# Patient Record
Sex: FEMALE | Race: WHITE | Employment: UNEMPLOYED | URBAN - METROPOLITAN AREA
[De-identification: names, ages, dates, MRNs, and addresses within clinical notes are randomized per-mention and may not be internally consistent; named-entity substitution may affect disease eponyms.]

---

## 2018-01-05 ENCOUNTER — OFFICE VISIT (OUTPATIENT)
Dept: CARDIOLOGY | Age: 48
End: 2018-01-05

## 2018-01-05 VITALS
DIASTOLIC BLOOD PRESSURE: 80 MMHG | TEMPERATURE: 97.5 F | WEIGHT: 141.8 LBS | BODY MASS INDEX: 24.21 KG/M2 | SYSTOLIC BLOOD PRESSURE: 122 MMHG | OXYGEN SATURATION: 98 % | HEIGHT: 64 IN | HEART RATE: 85 BPM | RESPIRATION RATE: 20 BRPM

## 2018-01-05 DIAGNOSIS — Z82.49 FAMILY HISTORY OF HEART DISEASE IN MALE FAMILY MEMBER BEFORE AGE 55: ICD-10-CM

## 2018-01-05 DIAGNOSIS — I10 ESSENTIAL HYPERTENSION: Primary | ICD-10-CM

## 2018-01-05 DIAGNOSIS — I34.1 MITRAL VALVE PROLAPSE: ICD-10-CM

## 2018-01-05 PROCEDURE — 99213 OFFICE O/P EST LOW 20 MIN: CPT | Performed by: INTERNAL MEDICINE

## 2018-01-05 ASSESSMENT — ENCOUNTER SYMPTOMS
SHORTNESS OF BREATH: 0
VOMITING: 0
NAUSEA: 0
WHEEZING: 0
BACK PAIN: 0
CHEST TIGHTNESS: 0

## 2018-01-10 ENCOUNTER — OFFICE VISIT (OUTPATIENT)
Dept: FAMILY MEDICINE CLINIC | Age: 48
End: 2018-01-10

## 2018-01-10 VITALS
WEIGHT: 142 LBS | HEART RATE: 85 BPM | OXYGEN SATURATION: 98 % | HEIGHT: 64 IN | SYSTOLIC BLOOD PRESSURE: 122 MMHG | BODY MASS INDEX: 24.24 KG/M2 | DIASTOLIC BLOOD PRESSURE: 82 MMHG

## 2018-01-10 DIAGNOSIS — L23.0 ALLERGIC CONTACT DERMATITIS DUE TO METALS: ICD-10-CM

## 2018-01-10 DIAGNOSIS — I10 ESSENTIAL HYPERTENSION: Primary | ICD-10-CM

## 2018-01-10 DIAGNOSIS — Z82.49 FAMILY HISTORY OF HEART DISEASE IN MALE FAMILY MEMBER BEFORE AGE 55: ICD-10-CM

## 2018-01-10 DIAGNOSIS — I34.1 MITRAL VALVE PROLAPSE: ICD-10-CM

## 2018-01-10 DIAGNOSIS — E55.9 HYPOVITAMINOSIS D: ICD-10-CM

## 2018-01-10 PROCEDURE — 99213 OFFICE O/P EST LOW 20 MIN: CPT | Performed by: FAMILY MEDICINE

## 2018-01-10 RX ORDER — CLOBETASOL PROPIONATE 0.5 MG/G
OINTMENT TOPICAL
Qty: 15 G | Refills: 0 | Status: SHIPPED | OUTPATIENT
Start: 2018-01-10 | End: 2020-01-21

## 2018-01-10 RX ORDER — LISINOPRIL AND HYDROCHLOROTHIAZIDE 20; 12.5 MG/1; MG/1
2 TABLET ORAL DAILY
Qty: 180 TABLET | Refills: 3 | Status: SHIPPED | OUTPATIENT
Start: 2018-01-10 | End: 2019-01-18 | Stop reason: SDUPTHER

## 2018-01-10 ASSESSMENT — PATIENT HEALTH QUESTIONNAIRE - PHQ9
2. FEELING DOWN, DEPRESSED OR HOPELESS: 0
SUM OF ALL RESPONSES TO PHQ9 QUESTIONS 1 & 2: 0
1. LITTLE INTEREST OR PLEASURE IN DOING THINGS: 0
SUM OF ALL RESPONSES TO PHQ QUESTIONS 1-9: 0

## 2018-01-10 NOTE — PROGRESS NOTES
Subjective:      Patient ID: Mary Salcido is a 41 y.o. female. Chief Complaint   Patient presents with    Hypertension     check up       HPI   Mary Salcido is a 41 y.o. female    Refills/checkup    Strong cardiac family history    History pre-eclampsia    Hypertension: Patient here for follow-up of elevated blood pressure. She is not formally exercising but is active and is adherent to low salt diet. Blood pressure is well controlled at home. Cardiac symptoms none. Patient denies  chest pressure/discomfort, claudication, dyspnea, exertional chest pressure/discomfort, fatigue, irregular heart beat, lower extremity edema, near-syncope, orthopnea, palpitations, paroxysmal nocturnal dyspnea, syncope and tachypnea. Cardiovascular risk factors: dyslipidemia, family history of premature cardiovascular disease and hypertension. Use of agents associated with hypertension: none. History of target organ damage: none.       Past Medical History:   Diagnosis Date    Hypertension     Mitral valve prolapse     Pre-eclampsia     Routine gynecological examination     SYLVESTER Rivera     Past Surgical History:   Procedure Laterality Date     SECTION       Family History   Problem Relation Age of Onset    Heart Disease Brother 46     MI    Other Maternal Grandfather 50     MI     Social History     Social History    Marital status:      Spouse name: N/A    Number of children: N/A    Years of education: N/A     Social History Main Topics    Smoking status: Never Smoker    Smokeless tobacco: Never Used    Alcohol use No    Drug use: No    Sexual activity: Yes     Partners: Male     Other Topics Concern    None     Social History Narrative    None     Current Outpatient Prescriptions   Medication Sig Dispense Refill    lisinopril-hydrochlorothiazide (PRINZIDE;ZESTORETIC) 20-12.5 MG per tablet Take 2 tablets by mouth daily 180 tablet 3    Coenzyme Q10 (CO Q 10 PO) Take by mouth      aspirin 81 aerobic exercise. Reduce stress    Follows with GYN    Updated health maintenance.     Continue Daily ASA recommended omega 3 fatty acids          Milton Soares MD

## 2018-01-25 DIAGNOSIS — I10 ESSENTIAL HYPERTENSION: ICD-10-CM

## 2018-01-25 DIAGNOSIS — E55.9 HYPOVITAMINOSIS D: ICD-10-CM

## 2018-01-25 LAB
ALBUMIN SERPL-MCNC: 4.8 G/DL (ref 3.9–4.9)
ALP BLD-CCNC: 83 U/L (ref 40–130)
ALT SERPL-CCNC: 13 U/L (ref 0–33)
ANION GAP SERPL CALCULATED.3IONS-SCNC: 16 MEQ/L (ref 7–13)
AST SERPL-CCNC: 14 U/L (ref 0–35)
BILIRUB SERPL-MCNC: 0.3 MG/DL (ref 0–1.2)
BUN BLDV-MCNC: 18 MG/DL (ref 6–20)
CALCIUM SERPL-MCNC: 9.6 MG/DL (ref 8.6–10.2)
CHLORIDE BLD-SCNC: 97 MEQ/L (ref 98–107)
CHOLESTEROL, TOTAL: 226 MG/DL (ref 0–199)
CO2: 23 MEQ/L (ref 22–29)
CREAT SERPL-MCNC: 0.46 MG/DL (ref 0.5–0.9)
GFR AFRICAN AMERICAN: >60
GFR NON-AFRICAN AMERICAN: >60
GLOBULIN: 2.4 G/DL (ref 2.3–3.5)
GLUCOSE BLD-MCNC: 95 MG/DL (ref 74–109)
HCT VFR BLD CALC: 43.4 % (ref 37–47)
HDLC SERPL-MCNC: 44 MG/DL (ref 40–59)
HEMOGLOBIN: 14.8 G/DL (ref 12–16)
LDL CHOLESTEROL CALCULATED: 143 MG/DL (ref 0–129)
MCH RBC QN AUTO: 32 PG (ref 27–31.3)
MCHC RBC AUTO-ENTMCNC: 34.1 % (ref 33–37)
MCV RBC AUTO: 93.9 FL (ref 82–100)
PDW BLD-RTO: 14.1 % (ref 11.5–14.5)
PLATELET # BLD: 401 K/UL (ref 130–400)
POTASSIUM SERPL-SCNC: 4.4 MEQ/L (ref 3.5–5.1)
RBC # BLD: 4.62 M/UL (ref 4.2–5.4)
SODIUM BLD-SCNC: 136 MEQ/L (ref 132–144)
TOTAL PROTEIN: 7.2 G/DL (ref 6.4–8.1)
TRIGL SERPL-MCNC: 197 MG/DL (ref 0–200)
VITAMIN D 25-HYDROXY: 44 NG/ML (ref 30–100)
WBC # BLD: 9.2 K/UL (ref 4.8–10.8)

## 2018-02-26 ENCOUNTER — OFFICE VISIT (OUTPATIENT)
Dept: CARDIOLOGY CLINIC | Age: 48
End: 2018-02-26
Payer: COMMERCIAL

## 2018-02-26 VITALS
OXYGEN SATURATION: 99 % | HEIGHT: 64 IN | DIASTOLIC BLOOD PRESSURE: 72 MMHG | BODY MASS INDEX: 24.62 KG/M2 | HEART RATE: 82 BPM | WEIGHT: 144.2 LBS | SYSTOLIC BLOOD PRESSURE: 112 MMHG | RESPIRATION RATE: 12 BRPM

## 2018-02-26 DIAGNOSIS — I34.1 MITRAL VALVE PROLAPSE: ICD-10-CM

## 2018-02-26 DIAGNOSIS — I10 ESSENTIAL HYPERTENSION: Primary | ICD-10-CM

## 2018-02-26 PROCEDURE — 99214 OFFICE O/P EST MOD 30 MIN: CPT | Performed by: INTERNAL MEDICINE

## 2018-02-26 RX ORDER — METOPROLOL SUCCINATE 25 MG/1
25 TABLET, EXTENDED RELEASE ORAL NIGHTLY
Qty: 30 TABLET | Refills: 3 | Status: SHIPPED | OUTPATIENT
Start: 2018-02-26 | End: 2018-03-26 | Stop reason: SDUPTHER

## 2018-02-26 ASSESSMENT — ENCOUNTER SYMPTOMS
APNEA: 0
CHEST TIGHTNESS: 0
SHORTNESS OF BREATH: 0

## 2018-02-26 NOTE — PROGRESS NOTES
MG tablet, Take 81 mg by mouth daily, Disp: , Rfl:     calcium carbonate (OSCAL) 500 MG TABS tablet, Take 500 mg by mouth daily. , Disp: , Rfl:     Multiple Vitamins-Minerals (MULTI COMPLETE PO), Take  by mouth., Disp: , Rfl:       Review of Systems:  Review of Systems   Constitutional: Negative for appetite change and fatigue. Respiratory: Negative for apnea, chest tightness and shortness of breath. Cardiovascular: Positive for palpitations. Negative for chest pain and leg swelling. Neurological: Negative for dizziness, syncope, weakness, light-headedness and headaches. Hematological: Does not bruise/bleed easily. Psychiatric/Behavioral: Negative for agitation, behavioral problems and confusion. The patient is not nervous/anxious and is not hyperactive. All other systems reviewed and are negative. Objective  Vitals:    02/26/18 1026   BP: 112/72   Site: Right Arm   Position: Sitting   Cuff Size: Medium Adult   Pulse: 82   Resp: 12   SpO2: 99%   Weight: 144 lb 3.2 oz (65.4 kg)   Height: 5' 4\" (1.626 m)         Physical Exam:  Physical Exam   Constitutional: She is oriented to person, place, and time. She appears well-developed and well-nourished. HENT:   Head: Normocephalic and atraumatic. Right Ear: External ear normal.   Left Ear: External ear normal.   Mouth/Throat: Oropharynx is clear and moist.   Eyes: Conjunctivae and EOM are normal. Pupils are equal, round, and reactive to light. Neck: Normal range of motion. Neck supple. Cardiovascular: Normal rate, regular rhythm, normal heart sounds and intact distal pulses. Pulmonary/Chest: Effort normal and breath sounds normal.   Abdominal: Soft. Bowel sounds are normal.   Musculoskeletal: Normal range of motion. Neurological: She is alert and oriented to person, place, and time. She has normal reflexes. Skin: Skin is warm and dry. Psychiatric: She has a normal mood and affect.  Her behavior is normal. Judgment and thought content normal.           Assessment/Orders:       ICD-10-CM ICD-9-CM    1. Essential hypertension I10 401.9 ECHO Complete 2D W Doppler W Color      NM Myocardial Spect Rest Exercise or Rx      Holter Monitor 24 Hour   2. Mitral valve prolapse I34.1 424.0 ECHO Complete 2D W Doppler W Color      NM Myocardial Spect Rest Exercise or Rx      Holter Monitor 24 Hour       Orders Placed This Encounter   Medications    metoprolol succinate (TOPROL XL) 25 MG extended release tablet     Sig: Take 1 tablet by mouth nightly     Dispense:  30 tablet     Refill:  3       There are no discontinued medications. Orders Placed This Encounter   Procedures    NM Myocardial Spect Rest Exercise or Rx     Standing Status:   Future     Standing Expiration Date:   2/26/2019     Scheduling Instructions: At [address] [place] [address] Specific Question:   Reason for Exam?     Answer: Tachycardia     Order Specific Question:   Procedure Type     Answer:   Exercise    Holter Monitor 24 Hour     At [facility]     Standing Status:   Future     Standing Expiration Date:   2/26/2019     Order Specific Question:   Reason for Exam?     Answer: Tachycardia    ECHO Complete 2D W Doppler W Color     Standing Status:   Future     Standing Expiration Date:   2/26/2019     Scheduling Instructions: At [address] [place] [address] Specific Question:   Reason for exam:     Answer:   htn         1. Patient to have stress test,echo and 24 hr holter. Add toprol 25mg qhs  2. Patient to see me in 1 month  > I will continue to monitor patient clinically, if symptoms develop or worsen, they are to let me know ASAP or head to the nearest emergeny room. > Follow up as discussed or call office sooner if needed.  > If refills are needed after appointment contact pharmacy.     Electronically signed by: Eloisa Mi MD  2/26/2018 1:20 PM

## 2018-03-15 DIAGNOSIS — R00.0 TACHYCARDIA: Primary | ICD-10-CM

## 2018-03-16 ENCOUNTER — HOSPITAL ENCOUNTER (OUTPATIENT)
Dept: NON INVASIVE DIAGNOSTICS | Age: 48
Discharge: HOME | End: 2018-03-16
Payer: COMMERCIAL

## 2018-03-16 ENCOUNTER — APPOINTMENT (OUTPATIENT)
Dept: NUCLEAR MEDICINE | Age: 48
End: 2018-03-16
Payer: COMMERCIAL

## 2018-03-16 ENCOUNTER — APPOINTMENT (OUTPATIENT)
Dept: NON INVASIVE DIAGNOSTICS | Age: 48
End: 2018-03-16
Payer: COMMERCIAL

## 2018-03-16 DIAGNOSIS — R00.0 TACHYCARDIA: ICD-10-CM

## 2018-03-16 DIAGNOSIS — I34.1 MITRAL VALVE PROLAPSE: ICD-10-CM

## 2018-03-16 DIAGNOSIS — I10 ESSENTIAL HYPERTENSION: ICD-10-CM

## 2018-03-16 LAB
LV EF: 60 %
LVEF MODALITY: NORMAL

## 2018-03-16 PROCEDURE — 93306 TTE W/DOPPLER COMPLETE: CPT

## 2018-03-16 PROCEDURE — 93017 CV STRESS TEST TRACING ONLY: CPT

## 2018-03-16 PROCEDURE — 93227 XTRNL ECG REC<48 HR R&I: CPT | Performed by: INTERNAL MEDICINE

## 2018-03-16 PROCEDURE — 93225 XTRNL ECG REC<48 HRS REC: CPT

## 2018-03-16 PROCEDURE — 93226 XTRNL ECG REC<48 HR SCAN A/R: CPT

## 2018-03-26 ENCOUNTER — OFFICE VISIT (OUTPATIENT)
Dept: CARDIOLOGY CLINIC | Age: 48
End: 2018-03-26
Payer: COMMERCIAL

## 2018-03-26 VITALS
OXYGEN SATURATION: 98 % | HEIGHT: 64 IN | BODY MASS INDEX: 24.62 KG/M2 | WEIGHT: 144.2 LBS | RESPIRATION RATE: 12 BRPM | DIASTOLIC BLOOD PRESSURE: 68 MMHG | HEART RATE: 72 BPM | SYSTOLIC BLOOD PRESSURE: 98 MMHG

## 2018-03-26 DIAGNOSIS — I10 ESSENTIAL HYPERTENSION: ICD-10-CM

## 2018-03-26 DIAGNOSIS — I34.1 MITRAL VALVE PROLAPSE: ICD-10-CM

## 2018-03-26 DIAGNOSIS — R00.0 TACHYCARDIA: Primary | ICD-10-CM

## 2018-03-26 PROCEDURE — 99213 OFFICE O/P EST LOW 20 MIN: CPT | Performed by: INTERNAL MEDICINE

## 2018-03-26 RX ORDER — METOPROLOL SUCCINATE 25 MG/1
25 TABLET, EXTENDED RELEASE ORAL NIGHTLY
Qty: 90 TABLET | Refills: 3 | Status: SHIPPED | OUTPATIENT
Start: 2018-03-26 | End: 2019-01-04 | Stop reason: SDUPTHER

## 2018-03-26 ASSESSMENT — ENCOUNTER SYMPTOMS
APNEA: 0
SHORTNESS OF BREATH: 0
CHEST TIGHTNESS: 0

## 2019-01-04 ENCOUNTER — OFFICE VISIT (OUTPATIENT)
Dept: CARDIOLOGY CLINIC | Age: 49
End: 2019-01-04
Payer: COMMERCIAL

## 2019-01-04 VITALS
DIASTOLIC BLOOD PRESSURE: 80 MMHG | SYSTOLIC BLOOD PRESSURE: 118 MMHG | BODY MASS INDEX: 24.92 KG/M2 | RESPIRATION RATE: 12 BRPM | WEIGHT: 146 LBS | HEART RATE: 76 BPM | HEIGHT: 64 IN

## 2019-01-04 DIAGNOSIS — I34.1 MITRAL VALVE PROLAPSE: ICD-10-CM

## 2019-01-04 DIAGNOSIS — R00.0 TACHYCARDIA: Primary | ICD-10-CM

## 2019-01-04 DIAGNOSIS — Z82.49 FAMILY HISTORY OF HEART DISEASE IN MALE FAMILY MEMBER BEFORE AGE 55: ICD-10-CM

## 2019-01-04 DIAGNOSIS — I10 ESSENTIAL HYPERTENSION: ICD-10-CM

## 2019-01-04 PROCEDURE — 99213 OFFICE O/P EST LOW 20 MIN: CPT | Performed by: INTERNAL MEDICINE

## 2019-01-04 RX ORDER — METOPROLOL SUCCINATE 25 MG/1
25 TABLET, EXTENDED RELEASE ORAL NIGHTLY
Qty: 90 TABLET | Refills: 3 | Status: SHIPPED | OUTPATIENT
Start: 2019-01-04 | End: 2019-03-27 | Stop reason: SDUPTHER

## 2019-01-04 ASSESSMENT — ENCOUNTER SYMPTOMS
APNEA: 0
ABDOMINAL DISTENTION: 0
COUGH: 0
CHEST TIGHTNESS: 0
BLOOD IN STOOL: 0
SHORTNESS OF BREATH: 0
ANAL BLEEDING: 0
DIARRHEA: 0
VOMITING: 0
ABDOMINAL PAIN: 0
NAUSEA: 0
COLOR CHANGE: 0

## 2019-01-18 ENCOUNTER — OFFICE VISIT (OUTPATIENT)
Dept: FAMILY MEDICINE CLINIC | Age: 49
End: 2019-01-18
Payer: COMMERCIAL

## 2019-01-18 VITALS
SYSTOLIC BLOOD PRESSURE: 130 MMHG | HEIGHT: 64 IN | HEART RATE: 76 BPM | WEIGHT: 146.6 LBS | BODY MASS INDEX: 25.03 KG/M2 | DIASTOLIC BLOOD PRESSURE: 80 MMHG | OXYGEN SATURATION: 97 %

## 2019-01-18 DIAGNOSIS — Z82.49 FAMILY HISTORY OF HEART DISEASE IN MALE FAMILY MEMBER BEFORE AGE 55: Primary | ICD-10-CM

## 2019-01-18 DIAGNOSIS — I10 ESSENTIAL HYPERTENSION: ICD-10-CM

## 2019-01-18 DIAGNOSIS — I34.1 MITRAL VALVE PROLAPSE: ICD-10-CM

## 2019-01-18 DIAGNOSIS — E55.9 HYPOVITAMINOSIS D: ICD-10-CM

## 2019-01-18 LAB
ALBUMIN SERPL-MCNC: 4.6 G/DL (ref 3.9–4.9)
ALP BLD-CCNC: 99 U/L (ref 40–130)
ALT SERPL-CCNC: 16 U/L (ref 0–33)
ANION GAP SERPL CALCULATED.3IONS-SCNC: 17 MEQ/L (ref 7–13)
AST SERPL-CCNC: 18 U/L (ref 0–35)
BILIRUB SERPL-MCNC: 0.3 MG/DL (ref 0–1.2)
BUN BLDV-MCNC: 16 MG/DL (ref 6–20)
CALCIUM SERPL-MCNC: 9.7 MG/DL (ref 8.6–10.2)
CHLORIDE BLD-SCNC: 96 MEQ/L (ref 98–107)
CHOLESTEROL, TOTAL: 226 MG/DL (ref 0–199)
CO2: 23 MEQ/L (ref 22–29)
CREAT SERPL-MCNC: 0.45 MG/DL (ref 0.5–0.9)
GFR AFRICAN AMERICAN: >60
GFR NON-AFRICAN AMERICAN: >60
GLOBULIN: 2.9 G/DL (ref 2.3–3.5)
GLUCOSE BLD-MCNC: 85 MG/DL (ref 74–109)
HCT VFR BLD CALC: 41.4 % (ref 37–47)
HDLC SERPL-MCNC: 38 MG/DL (ref 40–59)
HEMOGLOBIN: 14.2 G/DL (ref 12–16)
LDL CHOLESTEROL CALCULATED: 156 MG/DL (ref 0–129)
MCH RBC QN AUTO: 32.1 PG (ref 27–31.3)
MCHC RBC AUTO-ENTMCNC: 34.2 % (ref 33–37)
MCV RBC AUTO: 93.8 FL (ref 82–100)
PDW BLD-RTO: 14.2 % (ref 11.5–14.5)
PLATELET # BLD: 434 K/UL (ref 130–400)
POTASSIUM SERPL-SCNC: 3.7 MEQ/L (ref 3.5–5.1)
RBC # BLD: 4.41 M/UL (ref 4.2–5.4)
SODIUM BLD-SCNC: 136 MEQ/L (ref 132–144)
TOTAL PROTEIN: 7.5 G/DL (ref 6.4–8.1)
TRIGL SERPL-MCNC: 158 MG/DL (ref 0–200)
VITAMIN D 25-HYDROXY: 33.1 NG/ML (ref 30–100)
WBC # BLD: 8.9 K/UL (ref 4.8–10.8)

## 2019-01-18 PROCEDURE — 99213 OFFICE O/P EST LOW 20 MIN: CPT | Performed by: FAMILY MEDICINE

## 2019-01-18 RX ORDER — LISINOPRIL AND HYDROCHLOROTHIAZIDE 20; 12.5 MG/1; MG/1
2 TABLET ORAL DAILY
Qty: 180 TABLET | Refills: 3 | Status: SHIPPED | OUTPATIENT
Start: 2019-01-18 | End: 2020-01-21 | Stop reason: SDUPTHER

## 2019-01-18 ASSESSMENT — PATIENT HEALTH QUESTIONNAIRE - PHQ9
SUM OF ALL RESPONSES TO PHQ QUESTIONS 1-9: 0
SUM OF ALL RESPONSES TO PHQ9 QUESTIONS 1 & 2: 0
SUM OF ALL RESPONSES TO PHQ QUESTIONS 1-9: 0
1. LITTLE INTEREST OR PLEASURE IN DOING THINGS: 0
2. FEELING DOWN, DEPRESSED OR HOPELESS: 0

## 2019-03-27 RX ORDER — METOPROLOL SUCCINATE 25 MG/1
25 TABLET, EXTENDED RELEASE ORAL NIGHTLY
Qty: 90 TABLET | Refills: 3 | Status: SHIPPED | OUTPATIENT
Start: 2019-03-27 | End: 2020-01-06 | Stop reason: SDUPTHER

## 2019-09-21 ENCOUNTER — OFFICE VISIT (OUTPATIENT)
Dept: FAMILY MEDICINE CLINIC | Age: 49
End: 2019-09-21
Payer: COMMERCIAL

## 2019-09-21 VITALS
OXYGEN SATURATION: 98 % | HEIGHT: 64 IN | WEIGHT: 144.6 LBS | TEMPERATURE: 97.1 F | BODY MASS INDEX: 24.69 KG/M2 | HEART RATE: 73 BPM | DIASTOLIC BLOOD PRESSURE: 82 MMHG | SYSTOLIC BLOOD PRESSURE: 118 MMHG

## 2019-09-21 DIAGNOSIS — L30.1 DYSHIDROTIC ECZEMA: Primary | ICD-10-CM

## 2019-09-21 PROCEDURE — 99213 OFFICE O/P EST LOW 20 MIN: CPT | Performed by: NURSE PRACTITIONER

## 2019-09-21 NOTE — PROGRESS NOTES
connections:     Talks on phone: None     Gets together: None     Attends Restoration service: None     Active member of club or organization: None     Attends meetings of clubs or organizations: None     Relationship status: None    Intimate partner violence:     Fear of current or ex partner: None     Emotionally abused: None     Physically abused: None     Forced sexual activity: None   Other Topics Concern    None   Social History Narrative    None     Current Outpatient Medications on File Prior to Visit   Medication Sig Dispense Refill    metoprolol succinate (TOPROL XL) 25 MG extended release tablet Take 1 tablet by mouth nightly 90 tablet 3    lisinopril-hydrochlorothiazide (PRINZIDE;ZESTORETIC) 20-12.5 MG per tablet Take 2 tablets by mouth daily 180 tablet 3    clobetasol (TEMOVATE) 0.05 % ointment Apply topically 2 times daily. 15 g 0    Coenzyme Q10 (CO Q 10 PO) Take by mouth      aspirin 81 MG tablet Take 81 mg by mouth daily      calcium carbonate (OSCAL) 500 MG TABS tablet Take 500 mg by mouth daily.  Multiple Vitamins-Minerals (MULTI COMPLETE PO) Take  by mouth. No current facility-administered medications on file prior to visit. No Known Allergies    Review of Systems   Skin: Positive for rash. Objective  Vitals:    09/21/19 0838   BP: 118/82   Pulse: 73   Temp: 97.1 °F (36.2 °C)   SpO2: 98%   Weight: 144 lb 9.6 oz (65.6 kg)   Height: 5' 4\" (1.626 m)     Physical Exam   Constitutional: She is oriented to person, place, and time. She appears well-developed and well-nourished. Neurological: She is alert and oriented to person, place, and time. Skin: Skin is warm. Nursing note and vitals reviewed. Assessment & Plan     Diagnosis Orders   1. Dyshidrotic eczema         No orders of the defined types were placed in this encounter.       Orders Placed This Encounter   Medications    Crisaborole 2 % OINT     Sig: Apply 1 Squirt topically 2 times daily     Dispense:

## 2020-01-06 ENCOUNTER — OFFICE VISIT (OUTPATIENT)
Dept: CARDIOLOGY CLINIC | Age: 50
End: 2020-01-06
Payer: COMMERCIAL

## 2020-01-06 VITALS
RESPIRATION RATE: 20 BRPM | WEIGHT: 150.4 LBS | BODY MASS INDEX: 25.68 KG/M2 | OXYGEN SATURATION: 99 % | SYSTOLIC BLOOD PRESSURE: 122 MMHG | HEART RATE: 85 BPM | DIASTOLIC BLOOD PRESSURE: 80 MMHG | HEIGHT: 64 IN

## 2020-01-06 PROCEDURE — 99214 OFFICE O/P EST MOD 30 MIN: CPT | Performed by: INTERNAL MEDICINE

## 2020-01-06 RX ORDER — METOPROLOL SUCCINATE 25 MG/1
25 TABLET, EXTENDED RELEASE ORAL NIGHTLY
Qty: 90 TABLET | Refills: 3 | Status: SHIPPED | OUTPATIENT
Start: 2020-01-06 | End: 2021-01-22 | Stop reason: SDUPTHER

## 2020-01-06 RX ORDER — METOPROLOL SUCCINATE 25 MG/1
25 TABLET, EXTENDED RELEASE ORAL NIGHTLY
Qty: 90 TABLET | Refills: 3 | Status: SHIPPED | OUTPATIENT
Start: 2020-01-06 | End: 2020-01-06 | Stop reason: SDUPTHER

## 2020-01-06 ASSESSMENT — ENCOUNTER SYMPTOMS
DIARRHEA: 0
SHORTNESS OF BREATH: 0
CHEST TIGHTNESS: 0
VOMITING: 0
NAUSEA: 0
BLOOD IN STOOL: 0
APNEA: 0

## 2020-01-06 NOTE — PROGRESS NOTES
[facility] CARDIOLOGY OFFICE FOLLOW-UP      Patient: Antony Fuller  YOB: 1970  MRN: [id number]    Chief Complaint:  Chief Complaint   Patient presents with    1 Year Follow Up    Hypertension    Tachycardia    Mitral Valve Prolapse         Subjective/HPI:  20: Patient presents today for follow-up of mitral valve disease. No significant prolapse. No chest pain congestive heart failure. He will see me in 1 year     19: Patient presents today for Follow-up of mitral valve disease. And palpitations. Doing well. Stress test and echo were unremarkable last time. She carries a history of mitral before left. Last echo did not show it. She'll see me in one year     3/26/2018: Patient presents today for Evaluation of chest pain and palpitation. Much better with Toprol 25 minute M a day stress test and echo is negative. She has 2 sons were twins and 6 causing a lot of issues for her. Has a history of mitral prolapse. Not documented on this echo. See me in one year.      2018: Patient presents today for evaluation of chest pain and palpitations. She has a previous history of mitral prolapse and hypertension. Also complains of tachycardia up to heart rate of 150-160. Thyroid profile is normal. Will add Toprol 25, day. Regular stress test echo and a 24-hour Holter and then see me      18: Patient presents today for History of mitral valve prolapse. Hypertension. She has a strong family history coronary artery disease. No chest pain congestive heart failure symptoms or syncope. Blood pressure remains controlled. She'll see me in 6 months.            Past Medical History:   Diagnosis Date    Hypertension     Mitral valve prolapse     Pre-eclampsia     Routine gynecological examination     SYLVESTER Adler       Past Surgical History:   Procedure Laterality Date     SECTION         Family History   Problem Relation Age of Onset    Heart Disease Brother 46        MI    Other Maternal Grandfather 50        MI       Social History     Socioeconomic History    Marital status:      Spouse name: None    Number of children: None    Years of education: None    Highest education level: None   Occupational History    None   Social Needs    Financial resource strain: None    Food insecurity:     Worry: None     Inability: None    Transportation needs:     Medical: None     Non-medical: None   Tobacco Use    Smoking status: Never Smoker    Smokeless tobacco: Never Used   Substance and Sexual Activity    Alcohol use: No    Drug use: No    Sexual activity: Yes     Partners: Male   Lifestyle    Physical activity:     Days per week: None     Minutes per session: None    Stress: None   Relationships    Social connections:     Talks on phone: None     Gets together: None     Attends Tenriism service: None     Active member of club or organization: None     Attends meetings of clubs or organizations: None     Relationship status: None    Intimate partner violence:     Fear of current or ex partner: None     Emotionally abused: None     Physically abused: None     Forced sexual activity: None   Other Topics Concern    None   Social History Narrative    None       No Known Allergies    Current Outpatient Medications   Medication Sig Dispense Refill    Crisaborole 2 % OINT Apply 1 Squirt topically 2 times daily 100 g 5    metoprolol succinate (TOPROL XL) 25 MG extended release tablet Take 1 tablet by mouth nightly 90 tablet 3    lisinopril-hydrochlorothiazide (PRINZIDE;ZESTORETIC) 20-12.5 MG per tablet Take 2 tablets by mouth daily 180 tablet 3    clobetasol (TEMOVATE) 0.05 % ointment Apply topically 2 times daily. 15 g 0    Coenzyme Q10 (CO Q 10 PO) Take by mouth      calcium carbonate (OSCAL) 500 MG TABS tablet Take 500 mg by mouth daily.  Multiple Vitamins-Minerals (MULTI COMPLETE PO) Take  by mouth.       aspirin 81 MG tablet Take 81 mg by mouth daily       No motion. General: No tenderness, deformity or edema. Neurological: She is alert and oriented to person, place, and time. She has normal motor skills and normal reflexes. Gait normal.   Skin: Skin is warm and dry. Patient Active Problem List   Diagnosis    Mitral valve prolapse    HTN (hypertension)    Family history of heart disease in male family member before age 54    Hypovitaminosis D           No orders of the defined types were placed in this encounter. No orders of the defined types were placed in this encounter. Assessment:    1. Essential hypertension    2. Tachycardia    3. Mitral valve prolapse    4. Family history of heart disease in male family member before age 54       Plan:   Stay on same medications. See me in 1 year. This note was partially generated using Dragon voice recognition system, and there may be some incorrect words, spellings, punctuation that were not noticed in checking the note before saving.         Electronically signed by Renay Thayer MD on 1/6/2020 at 11:24 AM

## 2020-01-07 ASSESSMENT — ENCOUNTER SYMPTOMS: COLOR CHANGE: 0

## 2020-01-21 ENCOUNTER — OFFICE VISIT (OUTPATIENT)
Dept: FAMILY MEDICINE CLINIC | Age: 50
End: 2020-01-21
Payer: COMMERCIAL

## 2020-01-21 VITALS
HEIGHT: 64 IN | OXYGEN SATURATION: 97 % | SYSTOLIC BLOOD PRESSURE: 128 MMHG | DIASTOLIC BLOOD PRESSURE: 82 MMHG | HEART RATE: 74 BPM | BODY MASS INDEX: 25.27 KG/M2 | WEIGHT: 148 LBS

## 2020-01-21 PROCEDURE — 99396 PREV VISIT EST AGE 40-64: CPT | Performed by: FAMILY MEDICINE

## 2020-01-21 RX ORDER — LISINOPRIL AND HYDROCHLOROTHIAZIDE 20; 12.5 MG/1; MG/1
2 TABLET ORAL DAILY
Qty: 180 TABLET | Refills: 3 | Status: SHIPPED | OUTPATIENT
Start: 2020-01-21 | End: 2020-12-22 | Stop reason: SDUPTHER

## 2020-01-21 RX ORDER — CHLORAL HYDRATE 500 MG
3000 CAPSULE ORAL 3 TIMES DAILY
COMMUNITY

## 2020-01-21 ASSESSMENT — PATIENT HEALTH QUESTIONNAIRE - PHQ9
SUM OF ALL RESPONSES TO PHQ9 QUESTIONS 1 & 2: 0
2. FEELING DOWN, DEPRESSED OR HOPELESS: 0
SUM OF ALL RESPONSES TO PHQ QUESTIONS 1-9: 0
1. LITTLE INTEREST OR PLEASURE IN DOING THINGS: 0
SUM OF ALL RESPONSES TO PHQ QUESTIONS 1-9: 0

## 2020-01-21 NOTE — PROGRESS NOTES
Subjective:      Patient ID: Brice Bardales is a 54 y.o. female. Chief Complaint   Patient presents with    Annual Exam       HPI   Brice Bardales is a 54 y.o. female    Refills/checkup    Strong cardiac family history    Father with no significant heart history was recently found to have multiple blockages and valve disease    Home schooling her children    Has annual checkup with cardiology    History pre-eclampsia    Hypertension: Patient here for follow-up of elevated blood pressure. She is not formally exercising but is active and is adherent to low salt diet. Blood pressure is well controlled at home. Cardiac symptoms none. Patient denies  chest pressure/discomfort, claudication, dyspnea, exertional chest pressure/discomfort, fatigue, irregular heart beat, lower extremity edema, near-syncope, orthopnea, palpitations, paroxysmal nocturnal dyspnea, syncope and tachypnea. Cardiovascular risk factors: dyslipidemia, family history of premature cardiovascular disease and hypertension. Use of agents associated with hypertension: none. History of target organ damage: none.       Past Medical History:   Diagnosis Date    Hypertension     Mitral valve prolapse     Pre-eclampsia     Routine gynecological examination     SYLVESTER Marte     Past Surgical History:   Procedure Laterality Date     SECTION       Family History   Problem Relation Age of Onset    Heart Disease Brother 46        MI    Other Maternal Grandfather 50        MI     Social History     Socioeconomic History    Marital status:      Spouse name: None    Number of children: None    Years of education: None    Highest education level: None   Occupational History    None   Social Needs    Financial resource strain: None    Food insecurity:     Worry: None     Inability: None    Transportation needs:     Medical: None     Non-medical: None   Tobacco Use    Smoking status: Never Smoker    Smokeless tobacco: Never Used Substance and Sexual Activity    Alcohol use: No    Drug use: No    Sexual activity: Yes     Partners: Male   Lifestyle    Physical activity:     Days per week: None     Minutes per session: None    Stress: None   Relationships    Social connections:     Talks on phone: None     Gets together: None     Attends Pentecostal service: None     Active member of club or organization: None     Attends meetings of clubs or organizations: None     Relationship status: None    Intimate partner violence:     Fear of current or ex partner: None     Emotionally abused: None     Physically abused: None     Forced sexual activity: None   Other Topics Concern    None   Social History Narrative    None     Current Outpatient Medications   Medication Sig Dispense Refill    Omega-3 Fatty Acids (FISH OIL) 1000 MG CAPS Take 3,000 mg by mouth 3 times daily      lisinopril-hydrochlorothiazide (PRINZIDE;ZESTORETIC) 20-12.5 MG per tablet Take 2 tablets by mouth daily 180 tablet 3    metoprolol succinate (TOPROL XL) 25 MG extended release tablet Take 1 tablet by mouth nightly 90 tablet 3    Multiple Vitamins-Minerals (MULTI COMPLETE PO) Take  by mouth. No current facility-administered medications for this visit. No Known Allergies      Review of Systems:   General ROS: negative for - nausea, vomiting, chills, fatigue, fever, malaise, weight gain or weight loss  Respiratory ROS: no cough, shortness of breath, or wheezing  Cardiovascular ROS: no chest pain or dyspnea on exertion  Gastrointestinal ROS: no abdominal pain, change in bowel habits, or black or bloody stools  Genito-Urinary ROS: no dysuria, trouble voiding, or hematuria  Musculoskeletal ROS: negative for - gait disturbance, joint pain or joint stiffness  Neurological ROS: negative for - behavioral changes, memory loss, numbness/tingling, tremors or weakness    In general patient otherwise reports feeling well.        Objective:   Physical Exam:  /82

## 2020-03-06 DIAGNOSIS — Z00.00 PREVENTATIVE HEALTH CARE: ICD-10-CM

## 2020-03-06 LAB
ALBUMIN SERPL-MCNC: 4.5 G/DL (ref 3.5–4.6)
ALP BLD-CCNC: 90 U/L (ref 40–130)
ALT SERPL-CCNC: 15 U/L (ref 0–33)
ANION GAP SERPL CALCULATED.3IONS-SCNC: 16 MEQ/L (ref 9–15)
AST SERPL-CCNC: 18 U/L (ref 0–35)
BILIRUB SERPL-MCNC: 0.4 MG/DL (ref 0.2–0.7)
BUN BLDV-MCNC: 13 MG/DL (ref 6–20)
CALCIUM SERPL-MCNC: 9.7 MG/DL (ref 8.5–9.9)
CHLORIDE BLD-SCNC: 98 MEQ/L (ref 95–107)
CHOLESTEROL, TOTAL: 208 MG/DL (ref 0–199)
CO2: 22 MEQ/L (ref 20–31)
CREAT SERPL-MCNC: 0.5 MG/DL (ref 0.5–0.9)
GFR AFRICAN AMERICAN: >60
GFR NON-AFRICAN AMERICAN: >60
GLOBULIN: 3.1 G/DL (ref 2.3–3.5)
GLUCOSE BLD-MCNC: 108 MG/DL (ref 70–99)
HCT VFR BLD CALC: 42.1 % (ref 37–47)
HDLC SERPL-MCNC: 38 MG/DL (ref 40–59)
HEMOGLOBIN: 14.5 G/DL (ref 12–16)
LDL CHOLESTEROL CALCULATED: 141 MG/DL (ref 0–129)
MCH RBC QN AUTO: 32.6 PG (ref 27–31.3)
MCHC RBC AUTO-ENTMCNC: 34.4 % (ref 33–37)
MCV RBC AUTO: 94.7 FL (ref 82–100)
PDW BLD-RTO: 13.8 % (ref 11.5–14.5)
PLATELET # BLD: 430 K/UL (ref 130–400)
POTASSIUM SERPL-SCNC: 3.9 MEQ/L (ref 3.4–4.9)
RBC # BLD: 4.44 M/UL (ref 4.2–5.4)
SODIUM BLD-SCNC: 136 MEQ/L (ref 135–144)
TOTAL PROTEIN: 7.6 G/DL (ref 6.3–8)
TRIGL SERPL-MCNC: 147 MG/DL (ref 0–150)
WBC # BLD: 10.7 K/UL (ref 4.8–10.8)

## 2020-04-24 ENCOUNTER — TELEPHONE (OUTPATIENT)
Dept: FAMILY MEDICINE CLINIC | Age: 50
End: 2020-04-24

## 2020-05-14 ENCOUNTER — TELEMEDICINE (OUTPATIENT)
Dept: FAMILY MEDICINE CLINIC | Age: 50
End: 2020-05-14
Payer: COMMERCIAL

## 2020-05-14 VITALS
SYSTOLIC BLOOD PRESSURE: 120 MMHG | WEIGHT: 144.8 LBS | DIASTOLIC BLOOD PRESSURE: 77 MMHG | HEIGHT: 64 IN | BODY MASS INDEX: 24.72 KG/M2

## 2020-05-14 PROCEDURE — 99442 PR PHYS/QHP TELEPHONE EVALUATION 11-20 MIN: CPT | Performed by: FAMILY MEDICINE

## 2020-12-22 RX ORDER — LISINOPRIL AND HYDROCHLOROTHIAZIDE 20; 12.5 MG/1; MG/1
2 TABLET ORAL DAILY
Qty: 30 TABLET | Refills: 0 | Status: SHIPPED | OUTPATIENT
Start: 2020-12-22 | End: 2021-01-22 | Stop reason: SDUPTHER

## 2020-12-22 NOTE — TELEPHONE ENCOUNTER
Pt is waiting for mailaway to arrive, will be out of this medication today. Pt would like a short order sent in. Please advise.

## 2021-01-22 ENCOUNTER — OFFICE VISIT (OUTPATIENT)
Dept: FAMILY MEDICINE CLINIC | Age: 51
End: 2021-01-22
Payer: COMMERCIAL

## 2021-01-22 VITALS
WEIGHT: 145.6 LBS | BODY MASS INDEX: 24.86 KG/M2 | DIASTOLIC BLOOD PRESSURE: 70 MMHG | HEART RATE: 78 BPM | HEIGHT: 64 IN | SYSTOLIC BLOOD PRESSURE: 110 MMHG | OXYGEN SATURATION: 96 % | TEMPERATURE: 98.1 F

## 2021-01-22 DIAGNOSIS — I10 ESSENTIAL HYPERTENSION: ICD-10-CM

## 2021-01-22 DIAGNOSIS — E55.9 HYPOVITAMINOSIS D: ICD-10-CM

## 2021-01-22 DIAGNOSIS — I34.1 MITRAL VALVE PROLAPSE: ICD-10-CM

## 2021-01-22 DIAGNOSIS — Z82.49 FAMILY HISTORY OF HEART DISEASE IN MALE FAMILY MEMBER BEFORE AGE 55: ICD-10-CM

## 2021-01-22 DIAGNOSIS — Z00.00 PREVENTATIVE HEALTH CARE: ICD-10-CM

## 2021-01-22 DIAGNOSIS — Z00.00 PREVENTATIVE HEALTH CARE: Primary | ICD-10-CM

## 2021-01-22 LAB
ALBUMIN SERPL-MCNC: 4.4 G/DL (ref 3.5–4.6)
ALP BLD-CCNC: 108 U/L (ref 40–130)
ALT SERPL-CCNC: 16 U/L (ref 0–33)
ANION GAP SERPL CALCULATED.3IONS-SCNC: 16 MEQ/L (ref 9–15)
AST SERPL-CCNC: 19 U/L (ref 0–35)
BILIRUB SERPL-MCNC: 0.4 MG/DL (ref 0.2–0.7)
BUN BLDV-MCNC: 11 MG/DL (ref 6–20)
CALCIUM SERPL-MCNC: 10 MG/DL (ref 8.5–9.9)
CHLORIDE BLD-SCNC: 99 MEQ/L (ref 95–107)
CHOLESTEROL, TOTAL: 211 MG/DL (ref 0–199)
CO2: 27 MEQ/L (ref 20–31)
CREAT SERPL-MCNC: 0.42 MG/DL (ref 0.5–0.9)
GFR AFRICAN AMERICAN: >60
GFR NON-AFRICAN AMERICAN: >60
GLOBULIN: 2.8 G/DL (ref 2.3–3.5)
GLUCOSE BLD-MCNC: 118 MG/DL (ref 70–99)
HCT VFR BLD CALC: 41.2 % (ref 37–47)
HDLC SERPL-MCNC: 37 MG/DL (ref 40–59)
HEMOGLOBIN: 14 G/DL (ref 12–16)
HEPATITIS C ANTIBODY INTERPRETATION: NORMAL
LDL CHOLESTEROL CALCULATED: 126 MG/DL (ref 0–129)
MCH RBC QN AUTO: 32.3 PG (ref 27–31.3)
MCHC RBC AUTO-ENTMCNC: 34.1 % (ref 33–37)
MCV RBC AUTO: 94.8 FL (ref 82–100)
PDW BLD-RTO: 13.7 % (ref 11.5–14.5)
PLATELET # BLD: 435 K/UL (ref 130–400)
POTASSIUM SERPL-SCNC: 3.7 MEQ/L (ref 3.4–4.9)
RBC # BLD: 4.35 M/UL (ref 4.2–5.4)
SODIUM BLD-SCNC: 142 MEQ/L (ref 135–144)
TOTAL PROTEIN: 7.2 G/DL (ref 6.3–8)
TRIGL SERPL-MCNC: 240 MG/DL (ref 0–150)
VITAMIN D 25-HYDROXY: 77.6 NG/ML (ref 30–100)
WBC # BLD: 8.7 K/UL (ref 4.8–10.8)

## 2021-01-22 PROCEDURE — 99396 PREV VISIT EST AGE 40-64: CPT | Performed by: FAMILY MEDICINE

## 2021-01-22 RX ORDER — METOPROLOL SUCCINATE 25 MG/1
25 TABLET, EXTENDED RELEASE ORAL NIGHTLY
Qty: 90 TABLET | Refills: 3 | Status: SHIPPED | OUTPATIENT
Start: 2021-01-22 | End: 2022-01-25 | Stop reason: SDUPTHER

## 2021-01-22 RX ORDER — LISINOPRIL AND HYDROCHLOROTHIAZIDE 20; 12.5 MG/1; MG/1
2 TABLET ORAL DAILY
Qty: 90 TABLET | Refills: 3 | Status: SHIPPED | OUTPATIENT
Start: 2021-01-22 | End: 2021-09-13

## 2021-01-22 SDOH — ECONOMIC STABILITY: FOOD INSECURITY: WITHIN THE PAST 12 MONTHS, THE FOOD YOU BOUGHT JUST DIDN'T LAST AND YOU DIDN'T HAVE MONEY TO GET MORE.: NEVER TRUE

## 2021-01-22 SDOH — ECONOMIC STABILITY: TRANSPORTATION INSECURITY
IN THE PAST 12 MONTHS, HAS THE LACK OF TRANSPORTATION KEPT YOU FROM MEDICAL APPOINTMENTS OR FROM GETTING MEDICATIONS?: NO

## 2021-01-22 SDOH — ECONOMIC STABILITY: INCOME INSECURITY: HOW HARD IS IT FOR YOU TO PAY FOR THE VERY BASICS LIKE FOOD, HOUSING, MEDICAL CARE, AND HEATING?: NOT HARD AT ALL

## 2021-01-22 SDOH — ECONOMIC STABILITY: FOOD INSECURITY: WITHIN THE PAST 12 MONTHS, YOU WORRIED THAT YOUR FOOD WOULD RUN OUT BEFORE YOU GOT MONEY TO BUY MORE.: NEVER TRUE

## 2021-01-22 ASSESSMENT — PATIENT HEALTH QUESTIONNAIRE - PHQ9
1. LITTLE INTEREST OR PLEASURE IN DOING THINGS: 0
SUM OF ALL RESPONSES TO PHQ QUESTIONS 1-9: 0

## 2021-01-22 NOTE — PROGRESS NOTES
Subjective:      Patient ID: Griselda Guppy is a 52 y.o. female. Chief Complaint   Patient presents with    Annual Exam       HPI   Griselda Guppy is a 52 y.o. female    Refills/checkup    \"bored\"  Getting more exercise  avg 2 miles/day    Strong cardiac family history    Father with no significant heart history was recently found to have multiple blockages and valve disease    Has annual checkup with cardiology      Hypertension: Patient here for follow-up of elevated blood pressure. She is not formally exercising but is active and is adherent to low salt diet. Blood pressure is well controlled at home. Cardiac symptoms none. Patient denies  chest pressure/discomfort, claudication, dyspnea, exertional chest pressure/discomfort, fatigue, irregular heart beat, lower extremity edema, near-syncope, orthopnea, palpitations, paroxysmal nocturnal dyspnea, syncope and tachypnea. Cardiovascular risk factors: dyslipidemia, family history of premature cardiovascular disease and hypertension. Use of agents associated with hypertension: none. History of target organ damage: none.       Past Medical History:   Diagnosis Date    Hypertension     Mitral valve prolapse     Pre-eclampsia     Routine gynecological examination     SYLVESTER Edge     Past Surgical History:   Procedure Laterality Date     SECTION       Family History   Problem Relation Age of Onset    Heart Disease Brother 46        MI    Other Maternal Grandfather 50        MI     Social History     Socioeconomic History    Marital status:      Spouse name: None    Number of children: None    Years of education: None    Highest education level: None   Occupational History    None   Social Needs    Financial resource strain: Not hard at all   [place] insecurity     Worry: Never true     Inability: Never true    Transportation needs     Medical: No     Non-medical: No   Tobacco Use    Smoking status: Never Smoker    Smokeless tobacco: Never Used   Substance and Sexual Activity    Alcohol use: No    Drug use: No    Sexual activity: Yes     Partners: Male   Lifestyle    Physical activity     Days per week: None     Minutes per session: None    Stress: None   Relationships    Social connections     Talks on phone: None     Gets together: None     Attends Zoroastrianism service: None     Active member of club or organization: None     Attends meetings of clubs or organizations: None     Relationship status: None    Intimate partner violence     Fear of current or ex partner: None     Emotionally abused: None     Physically abused: None     Forced sexual activity: None   Other Topics Concern    None   Social History Narrative    None     Current Outpatient Medications   Medication Sig Dispense Refill    lisinopril-hydroCHLOROthiazide (PRINZIDE;ZESTORETIC) 20-12.5 MG per tablet Take 2 tablets by mouth daily 90 tablet 3    metoprolol succinate (TOPROL XL) 25 MG extended release tablet Take 1 tablet by mouth nightly 90 tablet 3    Omega-3 Fatty Acids (FISH OIL) 1000 MG CAPS Take 3,000 mg by mouth 3 times daily      Multiple Vitamins-Minerals (MULTI COMPLETE PO) Take  by mouth. No current facility-administered medications for this visit. No Known Allergies      Review of Systems:   General ROS: negative for - nausea, vomiting, chills, fatigue, fever, malaise, weight gain or weight loss  Respiratory ROS: no cough, shortness of breath, or wheezing  Cardiovascular ROS: no chest pain or dyspnea on exertion  Gastrointestinal ROS: no abdominal pain, change in bowel habits, or black or bloody stools  Genito-Urinary ROS: no dysuria, trouble voiding, or hematuria  Musculoskeletal ROS: negative for - gait disturbance, joint pain or joint stiffness  Neurological ROS: negative for - behavioral changes, memory loss, numbness/tingling, tremors or weakness    In general patient otherwise reports feeling well.        Objective:   Physical Exam:  BP 110/70 (Site: Left Upper Arm)   Pulse 78   Temp 98.1 °F (36.7 °C)   Ht 5' 4\" (1.626 m)   Wt 145 lb 9.6 oz (66 kg)   SpO2 96%   Breastfeeding No   BMI 24.99 kg/m²     Gen: Well, NAD, Alert, Oriented x 3   HEENT: EOMI, eyes clear, MMM  Skin: without rash or jaundice  Neck: no significant lymphadenopathy or thyromegaly  Lungs: CTA B w/out Rales/Wheezes/Rhonchi, Good respiratory effort   Heart: RRR, S1S2, w/out M/R/G, non-displaced PMI   Abdomen: Soft NT/ND, w/out R/G, w/ +BSx4   Ext: No C/C/E Bilaterally. Psych: euthymic        Assessment:       Diagnosis Orders   1. Preventative health care  Comprehensive Metabolic Panel    CBC    Lipid Panel    Hepatitis C Antibody   2. Essential hypertension  lisinopril-hydroCHLOROthiazide (PRINZIDE;ZESTORETIC) 20-12.5 MG per tablet    Comprehensive Metabolic Panel    CBC    Lipid Panel    Hepatitis C Antibody   3. Family history of heart disease in male family member before age 47     3. Mitral valve prolapse     5. Hypovitaminosis D  Vitamin D 25 Hydroxy            Plan:   Continue current treatment regimen. Continue current medications. Dietary sodium restriction. Regular aerobic exercise. Reduce stress    Follows with GYN    Updated health maintenance.     Continue Daily ASA recommended omega 3 fatty acids    Return for fasting labs          Erika Madrigal MD

## 2021-01-25 ENCOUNTER — OFFICE VISIT (OUTPATIENT)
Dept: CARDIOLOGY CLINIC | Age: 51
End: 2021-01-25
Payer: COMMERCIAL

## 2021-01-25 VITALS
HEIGHT: 64 IN | DIASTOLIC BLOOD PRESSURE: 82 MMHG | RESPIRATION RATE: 22 BRPM | OXYGEN SATURATION: 98 % | WEIGHT: 145 LBS | BODY MASS INDEX: 24.75 KG/M2 | HEART RATE: 80 BPM | SYSTOLIC BLOOD PRESSURE: 122 MMHG

## 2021-01-25 DIAGNOSIS — I10 ESSENTIAL HYPERTENSION: Primary | ICD-10-CM

## 2021-01-25 DIAGNOSIS — Z82.49 FAMILY HISTORY OF HEART DISEASE IN MALE FAMILY MEMBER BEFORE AGE 55: ICD-10-CM

## 2021-01-25 DIAGNOSIS — I34.1 MITRAL VALVE PROLAPSE: ICD-10-CM

## 2021-01-25 DIAGNOSIS — R00.0 TACHYCARDIA: ICD-10-CM

## 2021-01-25 PROCEDURE — 99214 OFFICE O/P EST MOD 30 MIN: CPT | Performed by: INTERNAL MEDICINE

## 2021-01-25 ASSESSMENT — ENCOUNTER SYMPTOMS
DIARRHEA: 0
VOMITING: 0
BLOOD IN STOOL: 0
CHEST TIGHTNESS: 0
SHORTNESS OF BREATH: 0
APNEA: 0
NAUSEA: 0

## 2021-01-25 NOTE — PROGRESS NOTES
congestive heart failure symptoms or syncope. Blood pressure remains controlled. She'll see me in 6 months.       Past Medical History:   Diagnosis Date    Hypertension     Mitral valve prolapse     Pre-eclampsia     Routine gynecological examination     Ashley Delarosa, ASHLIE       Past Surgical History:   Procedure Laterality Date     SECTION         Family History   Problem Relation Age of Onset    Heart Disease Brother 46        MI    Other Maternal Grandfather 50        MI       Social History     Socioeconomic History    Marital status:      Spouse name: None    Number of children: None    Years of education: None    Highest education level: None   Occupational History    None   Social Needs    Financial resource strain: Not hard at all   [employer] insecurity     Worry: Never true     Inability: Never true    Transportation needs     Medical: No     Non-medical: No   Tobacco Use    Smoking status: Never Smoker    Smokeless tobacco: Never Used   Substance and Sexual Activity    Alcohol use: No    Drug use: No    Sexual activity: Yes     Partners: Male   Lifestyle    Physical activity     Days per week: None     Minutes per session: None    Stress: None   Relationships    Social connections     Talks on phone: None     Gets together: None     Attends Ep[employer] service: None     Active member of club or organization: None     Attends meetings of clubs or organizations: None     Relationship status: None    Intimate partner violence     Fear of current or ex partner: None     Emotionally abused: None     Physically abused: None     Forced sexual activity: None   Other Topics Concern    None   Social History Narrative    None       No Known Allergies    Current Outpatient Medications   Medication Sig Dispense Refill    Coenzyme Q10 (CO Q 10 PO) Take by mouth      lisinopril-hydroCHLOROthiazide (PRINZIDE;ZESTORETIC) 20-12.5 MG per tablet Take 2 tablets by mouth daily 90 tablet 3    metoprolol succinate (TOPROL XL) 25 MG extended release tablet Take 1 tablet by mouth nightly 90 tablet 3    Omega-3 Fatty Acids (FISH OIL) 1000 MG CAPS Take 3,000 mg by mouth 3 times daily      Multiple Vitamins-Minerals (MULTI COMPLETE PO) Take  by mouth. No current facility-administered medications for this visit. Review of Systems:   Review of Systems   Constitutional: Negative for activity change, appetite change, diaphoresis, fatigue and unexpected weight change. HENT: Negative for facial swelling, nosebleeds, trouble swallowing and voice change. Respiratory: Negative for apnea, chest tightness, shortness of breath and wheezing. Cardiovascular: Negative for chest pain, palpitations and leg swelling. Gastrointestinal: Negative for abdominal distention, anal bleeding, blood in stool, diarrhea, nausea and vomiting. Genitourinary: Negative for decreased urine volume and dysuria. Musculoskeletal: Negative for gait problem, myalgias, neck pain and neck stiffness. Skin: Negative for color change, pallor, rash and wound. Neurological: Negative for dizziness, seizures, syncope, facial asymmetry, weakness, light-headedness, numbness and headaches. Hematological: Does not bruise/bleed easily. Psychiatric/Behavioral: Negative for agitation, behavioral problems, confusion, hallucinations and suicidal ideas. The patient is not nervous/anxious. All other systems reviewed and are negative. Review of System is negative except for as mentioned above. Physical Examination:    /82 (Site: Right Upper Arm, Position: Sitting, Cuff Size: Medium Adult)   Pulse 80   Resp 22   Ht 5' 4\" (1.626 m)   Wt 145 lb (65.8 kg)   SpO2 98%   BMI 24.89 kg/m²    Physical Exam   Constitutional: She appears healthy. No distress. HENT:   Nose: Nose normal.   Mouth/Throat: Dentition is normal. Oropharynx is clear. Eyes: Pupils are equal, round, and reactive to light.  Conjunctivae are normal.   Neck: Normal range of motion and thyroid normal. Neck supple. Cardiovascular: Regular rhythm, S1 normal, S2 normal, normal heart sounds, intact distal pulses and normal pulses. PMI is not displaced. No murmur heard. Pulmonary/Chest: She has no wheezes. She has no rales. She exhibits no tenderness. Abdominal: Soft. Bowel sounds are normal. She exhibits no distension and no mass. There is no splenomegaly or hepatomegaly. There is no abdominal tenderness. No hernia. Neurological: She is alert and oriented to person, place, and time. She has normal motor skills. Gait normal.   Skin: Skin is warm and dry. No cyanosis. No jaundice. Nails show no clubbing. Patient Active Problem List   Diagnosis    Mitral valve prolapse    HTN (hypertension)    Family history of heart disease in male family member before age 54    Hypovitaminosis D           No orders of the defined types were placed in this encounter. No orders of the defined types were placed in this encounter. Assessment/Orders:       ICD-10-CM    1. Essential hypertension  I10    2. Family history of heart disease in male family member before age 54  Z80.51    4. Tachycardia  R00.0    4. Mitral valve prolapse  I34.1        No orders of the defined types were placed in this encounter. There are no discontinued medications. No orders of the defined types were placed in this encounter. Plan:    Stay on same medications.     See me in 1 year        Electronically signed by: Minnie Shahid MD  1/26/2021 8:27 PM

## 2021-01-26 ASSESSMENT — ENCOUNTER SYMPTOMS
WHEEZING: 0
ABDOMINAL DISTENTION: 0
ANAL BLEEDING: 0
TROUBLE SWALLOWING: 0
VOICE CHANGE: 0
COLOR CHANGE: 0
FACIAL SWELLING: 0

## 2021-09-10 DIAGNOSIS — I10 ESSENTIAL HYPERTENSION: ICD-10-CM

## 2021-09-13 RX ORDER — LISINOPRIL AND HYDROCHLOROTHIAZIDE 20; 12.5 MG/1; MG/1
TABLET ORAL
Qty: 90 TABLET | Refills: 3 | Status: SHIPPED | OUTPATIENT
Start: 2021-09-13 | End: 2022-01-25 | Stop reason: SDUPTHER

## 2022-01-25 ENCOUNTER — OFFICE VISIT (OUTPATIENT)
Dept: FAMILY MEDICINE CLINIC | Age: 52
End: 2022-01-25
Payer: COMMERCIAL

## 2022-01-25 VITALS
BODY MASS INDEX: 24.24 KG/M2 | TEMPERATURE: 97.8 F | OXYGEN SATURATION: 98 % | DIASTOLIC BLOOD PRESSURE: 80 MMHG | SYSTOLIC BLOOD PRESSURE: 130 MMHG | WEIGHT: 142 LBS | HEIGHT: 64 IN | HEART RATE: 80 BPM

## 2022-01-25 DIAGNOSIS — I10 ESSENTIAL HYPERTENSION: ICD-10-CM

## 2022-01-25 DIAGNOSIS — E55.9 HYPOVITAMINOSIS D: ICD-10-CM

## 2022-01-25 DIAGNOSIS — I34.1 MITRAL VALVE PROLAPSE: ICD-10-CM

## 2022-01-25 DIAGNOSIS — Z00.00 PREVENTATIVE HEALTH CARE: Primary | ICD-10-CM

## 2022-01-25 DIAGNOSIS — Z82.49 FAMILY HISTORY OF HEART DISEASE IN MALE FAMILY MEMBER BEFORE AGE 55: ICD-10-CM

## 2022-01-25 PROCEDURE — 99396 PREV VISIT EST AGE 40-64: CPT | Performed by: FAMILY MEDICINE

## 2022-01-25 RX ORDER — LISINOPRIL AND HYDROCHLOROTHIAZIDE 20; 12.5 MG/1; MG/1
2 TABLET ORAL DAILY
Qty: 180 TABLET | Refills: 3 | Status: SHIPPED | OUTPATIENT
Start: 2022-01-25

## 2022-01-25 RX ORDER — METOPROLOL SUCCINATE 25 MG/1
25 TABLET, EXTENDED RELEASE ORAL NIGHTLY
Qty: 90 TABLET | Refills: 3 | Status: SHIPPED | OUTPATIENT
Start: 2022-01-25

## 2022-01-25 SDOH — ECONOMIC STABILITY: TRANSPORTATION INSECURITY
IN THE PAST 12 MONTHS, HAS LACK OF TRANSPORTATION KEPT YOU FROM MEETINGS, WORK, OR FROM GETTING THINGS NEEDED FOR DAILY LIVING?: NO

## 2022-01-25 SDOH — ECONOMIC STABILITY: FOOD INSECURITY: WITHIN THE PAST 12 MONTHS, THE FOOD YOU BOUGHT JUST DIDN'T LAST AND YOU DIDN'T HAVE MONEY TO GET MORE.: NEVER TRUE

## 2022-01-25 SDOH — ECONOMIC STABILITY: FOOD INSECURITY: WITHIN THE PAST 12 MONTHS, YOU WORRIED THAT YOUR FOOD WOULD RUN OUT BEFORE YOU GOT MONEY TO BUY MORE.: NEVER TRUE

## 2022-01-25 ASSESSMENT — PATIENT HEALTH QUESTIONNAIRE - PHQ9
SUM OF ALL RESPONSES TO PHQ QUESTIONS 1-9: 0
SUM OF ALL RESPONSES TO PHQ QUESTIONS 1-9: 0
2. FEELING DOWN, DEPRESSED OR HOPELESS: 0
SUM OF ALL RESPONSES TO PHQ9 QUESTIONS 1 & 2: 0
SUM OF ALL RESPONSES TO PHQ QUESTIONS 1-9: 0
1. LITTLE INTEREST OR PLEASURE IN DOING THINGS: 0
SUM OF ALL RESPONSES TO PHQ QUESTIONS 1-9: 0

## 2022-01-25 ASSESSMENT — SOCIAL DETERMINANTS OF HEALTH (SDOH): HOW HARD IS IT FOR YOU TO PAY FOR THE VERY BASICS LIKE FOOD, HOUSING, MEDICAL CARE, AND HEATING?: NOT HARD AT ALL

## 2022-01-25 NOTE — PROGRESS NOTES
Subjective:      Patient ID: Claudell Rana is a 54 y.o. female. Chief Complaint   Patient presents with    Annual Exam       HPI   Claudell Rana is a 54 y.o. female    Refills/checkup    Nothing new to report   Less exercise lately   Trying to up this     Strong cardiac family history    Has annual checkup with cardiology on Friday       Hypertension: Patient here for follow-up of elevated blood pressure. She is not formally exercising but is active and is adherent to low salt diet. Blood pressure is well controlled at home. Cardiac symptoms none. Patient denies  chest pressure/discomfort, claudication, dyspnea, exertional chest pressure/discomfort, fatigue, irregular heart beat, lower extremity edema, near-syncope, orthopnea, palpitations, paroxysmal nocturnal dyspnea, syncope and tachypnea. Cardiovascular risk factors: dyslipidemia, family history of premature cardiovascular disease and hypertension. Use of agents associated with hypertension: none. History of target organ damage: none.       Past Medical History:   Diagnosis Date    Hypertension     Mitral valve prolapse     Pre-eclampsia     Routine gynecological examination     SYLVESTER Yates     Past Surgical History:   Procedure Laterality Date     SECTION       Family History   Problem Relation Age of Onset    Heart Disease Brother 46        MI    Other Maternal Grandfather 50        MI     Social History     Socioeconomic History    Marital status:      Spouse name: None    Number of children: None    Years of education: None    Highest education level: None   Occupational History    None   Tobacco Use    Smoking status: Never Smoker    Smokeless tobacco: Never Used   Substance and Sexual Activity    Alcohol use: No    Drug use: No    Sexual activity: Yes     Partners: Male   Other Topics Concern    None   Social History Narrative    None     Social Determinants of Health     Financial Resource Strain: Low Risk     Difficulty of Paying Living Expenses: Not hard at all   Food Insecurity: No Food Insecurity    Worried About Running Out of Food in the Last Year: Never true    Ran Out of Food in the Last Year: Never true   Transportation Needs: No Transportation Needs    Lack of Transportation (Medical): No    Lack of Transportation (Non-Medical): No   Physical Activity:     Days of Exercise per Week: Not on file    Minutes of Exercise per Session: Not on file   Stress:     Feeling of Stress : Not on file   Social Connections:     Frequency of Communication with Friends and Family: Not on file    Frequency of Social Gatherings with Friends and Family: Not on file    Attends Druze Services: Not on file    Active Member of [address] [place] [address] or Organizations: Not on file    Attends Club or Organization Meetings: Not on file    Marital Status: Not on file   Intimate Partner Violence:     Fear of Current or Ex-Partner: Not on file    Emotionally Abused: Not on file    Physically Abused: Not on file    Sexually Abused: Not on file   Housing Stability:     Unable to Pay for Housing in the Last Year: Not on file    Number of [place] in the Last Year: Not on file    Unstable Housing in the Last Year: Not on file     Current Outpatient Medications   Medication Sig Dispense Refill    lisinopril-hydroCHLOROthiazide (PRINZIDE;ZESTORETIC) 20-12.5 MG per tablet Take 2 tablets by mouth daily 180 tablet 3    metoprolol succinate (TOPROL XL) 25 MG extended release tablet Take 1 tablet by mouth nightly 90 tablet 3    Coenzyme Q10 (CO Q 10 PO) Take by mouth      Omega-3 Fatty Acids (FISH OIL) 1000 MG CAPS Take 3,000 mg by mouth 3 times daily      Multiple Vitamins-Minerals (MULTI COMPLETE PO) Take  by mouth. No current facility-administered medications for this visit.      No Known Allergies      Review of Systems:   General ROS: negative for - nausea, vomiting, chills, fatigue, fever, malaise, weight gain or weight loss  Respiratory ROS: no cough, shortness of breath, or wheezing  Cardiovascular ROS: no chest pain or dyspnea on exertion  Gastrointestinal ROS: no abdominal pain, change in bowel habits, or black or bloody stools  Genito-Urinary ROS: no dysuria, trouble voiding, or hematuria  Musculoskeletal ROS: negative for - gait disturbance, joint pain or joint stiffness  Neurological ROS: negative for - behavioral changes, memory loss, numbness/tingling, tremors or weakness    In general patient otherwise reports feeling well. Objective:   Physical Exam:  /80 (Site: Right Upper Arm)   Pulse 80   Temp 97.8 °F (36.6 °C)   Ht 5' 4\" (1.626 m)   Wt 142 lb (64.4 kg)   SpO2 98%   Breastfeeding No   BMI 24.37 kg/m²     Gen: Well, NAD, Alert, Oriented x 3   HEENT: EOMI, eyes clear, MMM  Skin: without rash or jaundice  Neck: no significant lymphadenopathy or thyromegaly  Lungs: CTA B w/out Rales/Wheezes/Rhonchi, Good respiratory effort   Heart: RRR, S1S2, w/out M/R/G, non-displaced PMI   Abdomen: Soft NT/ND, w/out R/G, w/ +BSx4   Ext: No C/C/E Bilaterally. Psych: euthymic        Assessment:       Diagnosis Orders   1. Preventative health care     2. Essential hypertension  lisinopril-hydroCHLOROthiazide (PRINZIDE;ZESTORETIC) 20-12.5 MG per tablet    metoprolol succinate (TOPROL XL) 25 MG extended release tablet    Comprehensive Metabolic Panel    CBC    Lipid Panel   3. Hypovitaminosis D     4. Family history of heart disease in male family member before age 47     7. Mitral valve prolapse              Plan:   Continue current treatment regimen. Continue current medications. Dietary sodium restriction. Regular aerobic exercise. Reduce stress    Follows with GYN    Updated health maintenance.     Continue Daily ASA recommended omega 3 fatty acids    Return for fasting labs          Lisa Barreto MD

## 2022-03-10 DIAGNOSIS — I10 ESSENTIAL HYPERTENSION: ICD-10-CM

## 2022-03-10 LAB
ALBUMIN SERPL-MCNC: 4.5 G/DL (ref 3.5–4.6)
ALP BLD-CCNC: 93 U/L (ref 40–130)
ALT SERPL-CCNC: 15 U/L (ref 0–33)
ANION GAP SERPL CALCULATED.3IONS-SCNC: 12 MEQ/L (ref 9–15)
AST SERPL-CCNC: 15 U/L (ref 0–35)
BILIRUB SERPL-MCNC: 0.3 MG/DL (ref 0.2–0.7)
BUN BLDV-MCNC: 16 MG/DL (ref 6–20)
CALCIUM SERPL-MCNC: 9.7 MG/DL (ref 8.5–9.9)
CHLORIDE BLD-SCNC: 101 MEQ/L (ref 95–107)
CHOLESTEROL, TOTAL: 220 MG/DL (ref 0–199)
CO2: 25 MEQ/L (ref 20–31)
CREAT SERPL-MCNC: 0.44 MG/DL (ref 0.5–0.9)
GFR AFRICAN AMERICAN: >60
GFR NON-AFRICAN AMERICAN: >60
GLOBULIN: 2.6 G/DL (ref 2.3–3.5)
GLUCOSE BLD-MCNC: 116 MG/DL (ref 70–99)
HCT VFR BLD CALC: 39.9 % (ref 37–47)
HDLC SERPL-MCNC: 37 MG/DL (ref 40–59)
HEMOGLOBIN: 13.5 G/DL (ref 12–16)
LDL CHOLESTEROL CALCULATED: 150 MG/DL (ref 0–129)
MCH RBC QN AUTO: 31.7 PG (ref 27–31.3)
MCHC RBC AUTO-ENTMCNC: 33.9 % (ref 33–37)
MCV RBC AUTO: 93.6 FL (ref 82–100)
PDW BLD-RTO: 14 % (ref 11.5–14.5)
PLATELET # BLD: 416 K/UL (ref 130–400)
POTASSIUM SERPL-SCNC: 4.2 MEQ/L (ref 3.4–4.9)
RBC # BLD: 4.26 M/UL (ref 4.2–5.4)
SODIUM BLD-SCNC: 138 MEQ/L (ref 135–144)
TOTAL PROTEIN: 7.1 G/DL (ref 6.3–8)
TRIGL SERPL-MCNC: 167 MG/DL (ref 0–150)
WBC # BLD: 9.3 K/UL (ref 4.8–10.8)

## 2022-03-29 ENCOUNTER — OFFICE VISIT (OUTPATIENT)
Dept: CARDIOLOGY CLINIC | Age: 52
End: 2022-03-29
Payer: COMMERCIAL

## 2022-03-29 VITALS
HEART RATE: 78 BPM | DIASTOLIC BLOOD PRESSURE: 82 MMHG | SYSTOLIC BLOOD PRESSURE: 122 MMHG | WEIGHT: 148 LBS | OXYGEN SATURATION: 98 % | BODY MASS INDEX: 25.27 KG/M2 | HEIGHT: 64 IN | RESPIRATION RATE: 16 BRPM

## 2022-03-29 DIAGNOSIS — E78.5 DYSLIPIDEMIA: ICD-10-CM

## 2022-03-29 DIAGNOSIS — I34.0 MITRAL VALVE INSUFFICIENCY, UNSPECIFIED ETIOLOGY: ICD-10-CM

## 2022-03-29 DIAGNOSIS — I10 ESSENTIAL HYPERTENSION: ICD-10-CM

## 2022-03-29 DIAGNOSIS — Z82.49 FAMILY HISTORY OF PREMATURE CAD: ICD-10-CM

## 2022-03-29 PROCEDURE — 93000 ELECTROCARDIOGRAM COMPLETE: CPT | Performed by: PHYSICIAN ASSISTANT

## 2022-03-29 PROCEDURE — 99214 OFFICE O/P EST MOD 30 MIN: CPT | Performed by: PHYSICIAN ASSISTANT

## 2022-03-29 RX ORDER — ATORVASTATIN CALCIUM 10 MG/1
10 TABLET, FILM COATED ORAL DAILY
Qty: 30 TABLET | Refills: 5 | Status: SHIPPED | OUTPATIENT
Start: 2022-03-29 | End: 2022-04-08 | Stop reason: SDUPTHER

## 2022-03-29 ASSESSMENT — ENCOUNTER SYMPTOMS
ABDOMINAL DISTENTION: 0
SHORTNESS OF BREATH: 0
VOMITING: 0
CHEST TIGHTNESS: 0
COUGH: 0
NAUSEA: 0

## 2022-03-29 NOTE — PROGRESS NOTES
Patient: Clara Fortune  YOB: 1970  MRN: [id number]    Chief Complaint:  Chief Complaint   Patient presents with   [address]     Dr. Adolfo Ramos pt-LOV 1/25/21    Hypertension    Tachycardia         Subjective/HPI       3/29/22: This is a pleasant 57-year-old  female with past medical history significant for hypertension, dyslipidemia, history of tachycardia and extensive family history of CAD who presents for routine cardiac follow-up. She previously followed with Dr. Sandeep Pastor with last office visit in January 2021. She had undergone prior cardiac testing including Holter monitor in March 2018 which revealed no malignant tachycardia or bradycardia arrhythmias, no A. fib, no heart block, 2 supraventricular ectopic beats but overall negative Holter examination. She underwent stress test on 3/16/2018 which revealed a negative maximal treadmill stress EKG with good functional capacity for age and normal hemodynamic response to exercise. Echocardiogram 3/16/2018, normal LV systolic function with EF of 60%, trace mitral valve regurgitation, trace tricuspid regurgitation, RVSP 25 mmHg. Since her last office visit with Dr. Sandeep Pastor, she is doing very well overall. Denies any shortness of breath or dyspnea on exertion, chest pain, nausea, vomiting, orthopnea, PND, lower extremity edema, dizziness, lightheadedness, syncope, fever or chills. She will experience occasional palpitations but states this is usually when sitting in her hot tub. She is compliant with all of her medications. She is currently on a vegan diet and as such does not eat red meats, cheese, eggs etc. despite being on a vegan diet, patient still with mildly abnormal cholesterol panel when last checked on 3/10/2022. Most recent labs reviewed and documented below. CMP on 3/10/2022: Sodium 138, potassium 4.2, chloride 101, total CO2 25, BUN 16, creatinine 0.44, GFR greater than 60, glucose 116.   ALT 15, AST 15  CBC on 3/10/2022: WBC 9.3, hemoglobin 13.5, hematocrit 39.9, platelets elevated 233--CS appears that patient has had mildly elevated platelet count dating as far back as January 2018  Lipid panel on 3/10/2022: Total cholesterol elevated 220, triglycerides elevated 167, HDL low at 37, LDL elevated at 150      Most recent diagnostic testing reviewed and documented below  EKG 3/29/22: SR 80, no acute ischemic changes, QTc 413ms      Holter Monitor 3/16/18:  INDICATION:  Hypertension, mitral valve prolapse, and tachycardia.     PROCEDURE DETAILS:  The patient was monitored for 24 hours and collected  113,671 beats with a minimum heart rate of 57 beats per minute and maximum  of 125 beats per minute, and an average heart rate of 79 beats per minute.     There were two supraventricular ectopic beats. No ventricular ectopic  beats. No AFib. No heart block and no EKG changes.     CONCLUSIONS:  Negative or normal Holter exam.  Clinical correlation  suggested.       Lori Mendoza MD      Stress test 3/16/18:  INTERPRETATION OF RESULTS:  The patient exercised on Anthony protocol for 9  minutes and 49 seconds achieving 10.7 METs of activity. Resting heart rate  is 78 beats per minute, maximum heart rate 160 beats per minute which  represents 92% of maximum age-predicted heart rate. Resting blood pressure  118/73, maximum blood pressure 168/82.  _____ there was fatigue. Baseline  EKG showed sinus tachycardia with a heart rate of 78 beats per minute, no  evidence of any ischemia. EKG at peak exercise as well as in recovery did  not show any significant changes from baseline. There was no evidence of  any malignant tachy or bradyarrhythmias or any conduction abnormalities. There was good 1-minute heart rate recovery post peak exercise.     IMPRESSION:  1. Negative maximal treadmill stress EKG. 2.  Good functional capacity for age. 3.  Normal hemodynamic response to exercise.   4.  No evidence of any malignant tachy or bradyarrhythmias. 5.  No reported chest pain, chest pressure, or syncope.     ENMA MUNROE DO      Echocardiogram 3/16/18:  Conclusions   Summary   Normal left ventricular systolic function, no regional wall motion   abnormalities, estimated ejection fraction of 60%. Normal left ventricular wall thickness. Normal diastolic filling pattern for age. Trace (+) mitral regurgitation is present. No evidence of mitral valve stenosis. No evidence of aortic valve regurgitation . No evidence of aortic valve stenosis. There is Trace tricuspid regurgitation with estimated RVSP of 25 mm Hg. Signature   ----------------------------------------------------------------   Electronically signed by Dalia Landers DO(Interpreting   physician) on 03/16/2018 12:56 PM     Vital signs stable in office today. Blood pressure 122/82, heart rate 78, pulse ox 98% on room air. Weight is 148 pounds. PMHx:  HTN  Dyslipidemia  Hx tachycardia  Hx Pre-eclampsia  Hx mitral valve prolapse    Social Hx:  Non smoker     Family Hx:  Male cousin on father's side with CAD/stent at age 50   Maternal family with significant early CAD history  Brother passed from MI at 46  Father with mitral valve replacement    No Known Allergies    Current Outpatient Medications   Medication Sig Dispense Refill    atorvastatin (LIPITOR) 10 MG tablet Take 1 tablet by mouth daily 30 tablet 5    lisinopril-hydroCHLOROthiazide (PRINZIDE;ZESTORETIC) 20-12.5 MG per tablet Take 2 tablets by mouth daily 180 tablet 3    metoprolol succinate (TOPROL XL) 25 MG extended release tablet Take 1 tablet by mouth nightly 90 tablet 3    Coenzyme Q10 (CO Q 10 PO) Take by mouth      Omega-3 Fatty Acids (FISH OIL) 1000 MG CAPS Take 3,000 mg by mouth 3 times daily      Multiple Vitamins-Minerals (MULTI COMPLETE PO) Take  by mouth. No current facility-administered medications for this visit.        Past Medical History:   Diagnosis Date    Hypertension     Mitral valve prolapse     Pre-eclampsia     Routine gynecological examination     Ashley Strickland, [facility]       Past Surgical History:   Procedure Laterality Date     SECTION         Social History     Socioeconomic History    Marital status:      Spouse name: None    Number of children: None    Years of education: None    Highest education level: None   Occupational History    None   Tobacco Use    Smoking status: Never Smoker    Smokeless tobacco: Never Used   Substance and Sexual Activity    Alcohol use: No    Drug use: No    Sexual activity: Yes     Partners: Male   Other Topics Concern    None   Social History Narrative    None     Social Determinants of Health     Financial Resource Strain: Low Risk     Difficulty of Paying Living Expenses: Not hard at all   Food Insecurity: No Food Insecurity    Worried About Running Out of Food in the Last Year: Never true    Misty of Food in the Last Year: Never true   Transportation Needs: No Transportation Needs    Lack of Transportation (Medical): No    Lack of Transportation (Non-Medical):  No   Physical Activity:     Days of Exercise per Week: Not on file    Minutes of Exercise per Session: Not on file   Stress:     Feeling of Stress : Not on file   Social Connections:     Frequency of Communication with Friends and Family: Not on file    Frequency of Social Gatherings with Friends and Family: Not on file    Attends Sikhism Services: Not on file    Active Member of Clubs or Organizations: Not on file    Attends Club or Organization Meetings: Not on file    Marital Status: Not on file   Intimate Partner Violence:     Fear of Current or Ex-Partner: Not on file    Emotionally Abused: Not on file    Physically Abused: Not on file    Sexually Abused: Not on file   Housing Stability:     Unable to Pay for Housing in the Last Year: Not on file    Number of [place] in the Last Year: Not on file    Unstable Housing in the Last Year: Not on file       Family History   Problem Relation Age of Onset    Heart Disease Brother 46        MI    Other Maternal Grandfather 50        MI         Review of Systems:   Review of Systems   Constitutional: Negative for activity change, appetite change, fever and unexpected weight change. HENT: Negative for congestion. Respiratory: Negative for cough, chest tightness and shortness of breath. Cardiovascular: Positive for palpitations (occasional). Negative for chest pain and leg swelling. Gastrointestinal: Negative for abdominal distention, nausea and vomiting. Genitourinary: Negative for difficulty urinating. Musculoskeletal: Negative for myalgias. Neurological: Negative for dizziness, syncope and light-headedness. Psychiatric/Behavioral: Negative for agitation. Physical Examination:    /82 (Site: Left Upper Arm, Position: Sitting, Cuff Size: Medium Adult)   Pulse 78   Resp 16   Ht 5' 4\" (1.626 m)   Wt 148 lb (67.1 kg)   SpO2 98%   BMI 25.40 kg/m²    Physical Exam  Constitutional:       General: She is not in acute distress. Appearance: Normal appearance. HENT:      Head: Normocephalic and atraumatic. Neck:      Vascular: No carotid bruit. Cardiovascular:      Rate and Rhythm: Normal rate and regular rhythm. Heart sounds: No murmur heard. Pulmonary:      Effort: Pulmonary effort is normal. No respiratory distress. Breath sounds: No wheezing, rhonchi or rales. Abdominal:      Palpations: Abdomen is soft. Tenderness: There is no abdominal tenderness. Musculoskeletal:         General: Normal range of motion. Right lower leg: No edema. Left lower leg: No edema. Skin:     General: Skin is warm and dry. Neurological:      General: No focal deficit present. Mental Status: She is alert and oriented to person, place, and time. Cranial Nerves: No cranial nerve deficit.    Psychiatric:         Mood and Affect: Mood normal.         Behavior: Behavior normal.         LABS:  CBC:   Lab Results   Component Value Date    WBC 9.3 03/10/2022    RBC 4.26 03/10/2022    HGB 13.5 03/10/2022    HCT 39.9 03/10/2022    MCV 93.6 03/10/2022    MCH 31.7 03/10/2022    MCHC 33.9 03/10/2022    RDW 14.0 03/10/2022     03/10/2022    MPV 7.7 03/04/2014     Lipids:  Lab Results   Component Value Date    CHOL 220 (H) 03/10/2022    CHOL 211 (H) 01/22/2021    CHOL 208 (H) 03/06/2020     Lab Results   Component Value Date    TRIG 167 (H) 03/10/2022    TRIG 240 (H) 01/22/2021    TRIG 147 03/06/2020     Lab Results   Component Value Date    HDL 37 (L) 03/10/2022    HDL 37 (L) 01/22/2021    HDL 38 (L) 03/06/2020     Lab Results   Component Value Date    LDLCALC 150 (H) 03/10/2022    LDLCALC 126 01/22/2021    LDLCALC 141 (H) 03/06/2020     No results found for: LABVLDL, VLDL  Lab Results   Component Value Date    CHOLHDLRATIO 4.8 07/19/2012     CMP:    Lab Results   Component Value Date     03/10/2022    K 4.2 03/10/2022     03/10/2022    CO2 25 03/10/2022    BUN 16 03/10/2022    CREATININE 0.44 03/10/2022    GFRAA >60.0 03/10/2022    LABGLOM >60.0 03/10/2022    GLUCOSE 116 03/10/2022    PROT 7.1 03/10/2022    LABALBU 4.5 03/10/2022    CALCIUM 9.7 03/10/2022    BILITOT 0.3 03/10/2022    ALKPHOS 93 03/10/2022    AST 15 03/10/2022    ALT 15 03/10/2022     BMP:    Lab Results   Component Value Date     03/10/2022    K 4.2 03/10/2022     03/10/2022    CO2 25 03/10/2022    BUN 16 03/10/2022    LABALBU 4.5 03/10/2022    CREATININE 0.44 03/10/2022    CALCIUM 9.7 03/10/2022    GFRAA >60.0 03/10/2022    LABGLOM >60.0 03/10/2022    GLUCOSE 116 03/10/2022     Magnesium:  No results found for: MG  TSH:  Lab Results   Component Value Date    TSH 1.160 03/04/2014     . result  No results for input(s): PROBNP in the last 72 hours. No results for input(s): INR in the last 72 hours.             Patient Active Problem List   Diagnosis    Mitral valve prolapse    HTN (hypertension)    Family history of heart disease in male family member before age 54    Hypovitaminosis D       Assessment/Plan:     Diagnosis Orders   1. Essential hypertension  EKG 12 Lead    Echo 2D w doppler w color complete    Stable. Continue current meds   2. Dyslipidemia  Lipid Panel    Lipid panel 3/10/22 reviewed. Add lipitor 10mg PO daily. Repeat lipid panel in 6 months   3. Mitral valve insufficiency, unspecified etiology  Echo 2D w doppler w color complete    Trace MR/TR per echo 3/16/18. Repeat echocardiogram ordered to re-evaluate   4. Family history of premature CAD      on both maternal and paternal side. Brother passed at age 46 from MI     -Maximize medical therapyToprol-XL 25 mg p.o. daily, lisinopril/hydrochlorothiazide 20/12.5 mg tablet 2 tablets daily, add Lipitor 10 mg p.o. daily, fish oil as directed  -Cardiac diet recommended. Patient reports being on a vegan diet  -Weight loss recommended  -Recent lipid panel from 3/10/2022 reviewed. Noted dyslipidemia with LDL of 150-- review of previous lipid panels dating back to 2018 revealing mild dyslipidemia. Patient already on vegan diet and does not know how she could possibly more strictly modify her diet than what she is doing currently. Also, extensive family history of CAD on both sides. Recommend initiation of low-dose Lipitor 10 mg p.o. daily. -Repeat fasting lipid panel in 6 months following initiation of low-dose Lipitor  -Recommend routine exercise at least 3-5 times per week  -Check echocardiogram to reevaluate LV function, valvular structures and pulmonary pressures. Last echocardiogram 3/16/2018: Normal LV systolic function with EF of 60%, trace MR/TR, RVSP of 25 mmHg  Status post stress test 3/16/2018:1. Negative maximal treadmill stress EKG; 2.  Good functional capacity for age; 3. Normal hemodynamic response to exercise; 4. No evidence of any malignant tachy or bradyarrhythmias; 5.   No reported chest pain, chest pressure, or syncope  Holter monitor 3/16/2018: Unremarkable  -Recommend routine blood pressure monitoring at home. Advised patient to at least check blood pressure periodically and notify office if BP running high with  mmHg or above  -Maintain routine follow-up with PCP  -Patient advised to notify office immediately if any questions, concerns or changes in condition  -Consider repeat ischemic evaluation in future as warranted by symptoms.   Patient without any present anginal complaints and EKG in office today benign with no acute ischemic changes.  -Follow-up in 1 year or sooner if needed

## 2022-03-29 NOTE — PATIENT INSTRUCTIONS
-Add Lipitor 10mg PO daily   -Check repeat fasting lipid panel in 6 months following addition of Lipitor      -Check repeat repeat echo to re-evaluate LVF and valvular structures    -Periodically check BP at home and notify office if BP running high with SBP (top number) 165mmHg or above

## 2022-04-08 RX ORDER — ATORVASTATIN CALCIUM 10 MG/1
10 TABLET, FILM COATED ORAL DAILY
Qty: 90 TABLET | Refills: 2 | Status: SHIPPED | OUTPATIENT
Start: 2022-04-08

## 2022-04-08 NOTE — TELEPHONE ENCOUNTER
Please approve or deny this refill request. The order is pended. Thank you.     LOV 3/29/2022    Next Visit Date:  Future Appointments   Date Time Provider Santosh Julien   5/10/2022  2:00 PM JUNIOR Sanchez 84   1/26/2023  9:30 AM Diomedes Madsen MD [facility]   3/21/2023 11:00 AM CHELA Pardo [facility] CARD [facility] [place]         Verbal order received with read back

## 2022-04-22 ENCOUNTER — OFFICE VISIT (OUTPATIENT)
Dept: FAMILY MEDICINE CLINIC | Age: 52
End: 2022-04-22
Payer: COMMERCIAL

## 2022-04-22 VITALS
TEMPERATURE: 98.5 F | HEIGHT: 64 IN | SYSTOLIC BLOOD PRESSURE: 134 MMHG | HEART RATE: 75 BPM | BODY MASS INDEX: 25.27 KG/M2 | WEIGHT: 148 LBS | OXYGEN SATURATION: 98 % | DIASTOLIC BLOOD PRESSURE: 80 MMHG

## 2022-04-22 DIAGNOSIS — N95.1 PERIMENOPAUSAL: Primary | ICD-10-CM

## 2022-04-22 DIAGNOSIS — R63.5 WEIGHT GAIN: ICD-10-CM

## 2022-04-22 DIAGNOSIS — N95.1 PERIMENOPAUSAL: ICD-10-CM

## 2022-04-22 DIAGNOSIS — R23.2 FLUSHING: ICD-10-CM

## 2022-04-22 DIAGNOSIS — E78.5 DYSLIPIDEMIA: ICD-10-CM

## 2022-04-22 LAB
CHOLESTEROL, TOTAL: 166 MG/DL (ref 0–199)
HDLC SERPL-MCNC: 38 MG/DL (ref 40–59)
LDL CHOLESTEROL CALCULATED: 97 MG/DL (ref 0–129)
T4 FREE: 1.12 NG/DL (ref 0.84–1.68)
TRIGL SERPL-MCNC: 155 MG/DL (ref 0–150)
TSH SERPL DL<=0.05 MIU/L-ACNC: 1.14 UIU/ML (ref 0.44–3.86)

## 2022-04-22 PROCEDURE — 99213 OFFICE O/P EST LOW 20 MIN: CPT | Performed by: FAMILY MEDICINE

## 2022-04-22 NOTE — PROGRESS NOTES
Subjective:      Patient ID: Saurabh Hovoer is a 54 y.o. female. Chief Complaint   Patient presents with    Referral - General     to endo, adrental symptoms       HPI   Saurabh Hoover is a 54 y.o. female    appt about possible hormonal issues  Wondering about cortisol/cushing  Feels she has trouble losing belly fat, feels rounding of face and \"hump\" on back of neck     Trouble concentrating  Fluid retention  Muscle weakness  Fatigue  Bruising  Zion cheeks       Hypertension: Patient here for follow-up of elevated blood pressure. She is not formally exercising but is active and is adherent to low salt diet. Blood pressure is well controlled at home. Cardiac symptoms none. Patient denies  chest pressure/discomfort, claudication, dyspnea, exertional chest pressure/discomfort, fatigue, irregular heart beat, lower extremity edema, near-syncope, orthopnea, palpitations, paroxysmal nocturnal dyspnea, syncope and tachypnea. Cardiovascular risk factors: dyslipidemia, family history of premature cardiovascular disease and hypertension. Use of agents associated with hypertension: none. History of target organ damage: none.       Past Medical History:   Diagnosis Date    Hypertension     Mitral valve prolapse     Pre-eclampsia     Routine gynecological examination     SYLVESTER Tanner     Past Surgical History:   Procedure Laterality Date     SECTION       Family History   Problem Relation Age of Onset    Heart Disease Brother 46        MI    Other Maternal Grandfather [address]        [place]     Social History     Socioeconomic History    Marital status:      Spouse name: None    Number of children: None    Years of education: None    Highest education level: None   Occupational History    None   Tobacco Use    Smoking status: Never Smoker    Smokeless tobacco: Never Used   Substance and Sexual Activity    Alcohol use: No    Drug use: No    Sexual activity: Yes     Partners: Male   Other Topics Concern    None   Social History Narrative    None     Social Determinants of Health     Financial Resource Strain: Low Risk     Difficulty of Paying Living Expenses: Not hard at all   Food Insecurity: No Food Insecurity    Worried About Running Out of Food in the Last Year: Never true    Misty of Food in the Last Year: Never true   Transportation Needs: No Transportation Needs    Lack of Transportation (Medical): No    Lack of Transportation (Non-Medical): No   Physical Activity:     Days of Exercise per Week: Not on file    Minutes of Exercise per Session: Not on file   Stress:     Feeling of Stress : Not on file   Social Connections:     Frequency of Communication with Friends and Family: Not on file    Frequency of Social Gatherings with Friends and Family: Not on file    Attends Hinduism Services: Not on file    Active Member of [address] [place] [address] [employer] or Organizations: Not on file    Attends Club or Organization Meetings: Not on file    Marital Status: Not on file   Intimate Partner Violence:     Fear of Current or Ex-Partner: Not on file    Emotionally Abused: Not on file    Physically Abused: Not on file    Sexually Abused: Not on file   Housing Stability:     Unable to Pay for Housing in the Last Year: Not on file    Number of [place] in the Last Year: Not on file    Unstable Housing in the Last Year: Not on file     Current Outpatient Medications   Medication Sig Dispense Refill    atorvastatin (LIPITOR) 10 MG tablet Take 1 tablet by mouth daily 90 tablet 2    lisinopril-hydroCHLOROthiazide (PRINZIDE;ZESTORETIC) 20-12.5 MG per tablet Take 2 tablets by mouth daily 180 tablet 3    metoprolol succinate (TOPROL XL) 25 MG extended release tablet Take 1 tablet by mouth nightly 90 tablet 3    Coenzyme Q10 (CO Q 10 PO) Take by mouth      Omega-3 Fatty Acids (FISH OIL) 1000 MG CAPS Take 3,000 mg by mouth 3 times daily      Multiple Vitamins-Minerals (MULTI COMPLETE PO) Take  by mouth.        No current facility-administered medications for this visit. No Known Allergies      Review of Systems:   General ROS: per HPI  Respiratory ROS: no cough, shortness of breath, or wheezing  Cardiovascular ROS: no chest pain or dyspnea on exertion  Gastrointestinal ROS: no abdominal pain, change in bowel habits, or black or bloody stools  Genito-Urinary ROS: no dysuria, trouble voiding, or hematuria  Musculoskeletal ROS: negative for - gait disturbance, joint pain or joint stiffness  Neurological ROS: negative for - behavioral changes, memory loss, numbness/tingling, tremors or weakness    In general patient otherwise reports feeling well. Objective:   Physical Exam:  /80 (Site: Left Upper Arm)   Pulse 75   Temp 98.5 °F (36.9 °C)   Ht 5' 4\" (1.626 m)   Wt 148 lb (67.1 kg)   SpO2 98%   Breastfeeding No   BMI 25.40 kg/m²     Gen: Well, NAD, Alert, Oriented x 3   HEENT: EOMI, eyes clear, MMM  Skin: without rash or jaundice  Neck: no significant lymphadenopathy or thyromegaly  Lungs: CTA B w/out Rales/Wheezes/Rhonchi, Good respiratory effort   Heart: RRR, S1S2, w/out M/R/G, non-displaced PMI   Abdomen: Soft NT/ND, w/out R/G, w/ +BSx4   Ext: No C/C/E Bilaterally. Psych: euthymic        Assessment:       Diagnosis Orders   1. Perimenopausal  Follicle Stimulating Hormone    Estrogens, Fractionated    Diony Mendiola MD, Endocrinology, [place]   2. Weight gain  TSH    T4, Free    ACTH    Cortisol Total    [facility], MD, Endocrinology, [place]   3.  Flushing  TSH    T4, Free    ACTH    Cortisol Total    Diony Mendiola MD, Endocrinology, [place]            Plan:     Labs as ordered  referral to endocrine    Going on Avis Ordonez MD

## 2022-04-23 LAB — FOLLICLE STIMULATING HORMONE: 6.7 MIU/ML (ref 1.7–21.5)

## 2022-05-04 LAB
ESTRADIOL LEVEL: 49.7 PG/ML
ESTROGEN TOTAL: 111.4 PG/ML
ESTRONE: 61.7 PG/ML

## 2022-05-10 ENCOUNTER — HOSPITAL ENCOUNTER (OUTPATIENT)
Dept: NON INVASIVE DIAGNOSTICS | Age: 52
Discharge: HOME | End: 2022-05-10
Payer: COMMERCIAL

## 2022-05-10 DIAGNOSIS — I34.0 MITRAL VALVE INSUFFICIENCY, UNSPECIFIED ETIOLOGY: ICD-10-CM

## 2022-05-10 DIAGNOSIS — I10 ESSENTIAL HYPERTENSION: ICD-10-CM

## 2022-05-10 LAB
LV EF: 50 %
LVEF MODALITY: NORMAL

## 2022-05-10 PROCEDURE — 93306 TTE W/DOPPLER COMPLETE: CPT

## 2022-05-11 ENCOUNTER — TELEPHONE (OUTPATIENT)
Dept: CARDIOLOGY CLINIC | Age: 52
End: 2022-05-11

## 2022-05-11 NOTE — TELEPHONE ENCOUNTER
Per Claudia Harrison PA-C echo shows normal EF of 50% and no significant valve disease. Patient aware.

## 2022-06-02 DIAGNOSIS — R63.5 WEIGHT GAIN: ICD-10-CM

## 2022-06-02 DIAGNOSIS — R23.2 FLUSHING: ICD-10-CM

## 2022-06-03 LAB
ADRENOCORTICOTROPIC HORMONE: 27 PG/ML (ref 7.2–63.3)
CORTISOL COLLECTION INFO: NORMAL
CORTISOL: 11.5 UG/DL (ref 2.7–18.4)

## 2022-11-17 ENCOUNTER — OFFICE VISIT (OUTPATIENT)
Dept: FAMILY MEDICINE CLINIC | Age: 52
End: 2022-11-17
Payer: COMMERCIAL

## 2022-11-17 VITALS
HEART RATE: 83 BPM | DIASTOLIC BLOOD PRESSURE: 86 MMHG | BODY MASS INDEX: 24.92 KG/M2 | HEIGHT: 64 IN | WEIGHT: 146 LBS | TEMPERATURE: 96.9 F | SYSTOLIC BLOOD PRESSURE: 130 MMHG | OXYGEN SATURATION: 93 %

## 2022-11-17 DIAGNOSIS — I10 ESSENTIAL HYPERTENSION: ICD-10-CM

## 2022-11-17 DIAGNOSIS — Z82.49 FAMILY HISTORY OF HEART DISEASE IN MALE FAMILY MEMBER BEFORE AGE 55: ICD-10-CM

## 2022-11-17 DIAGNOSIS — M54.42 ACUTE LEFT-SIDED LOW BACK PAIN WITH LEFT-SIDED SCIATICA: Primary | ICD-10-CM

## 2022-11-17 PROCEDURE — 99213 OFFICE O/P EST LOW 20 MIN: CPT | Performed by: FAMILY MEDICINE

## 2022-11-17 PROCEDURE — 3074F SYST BP LT 130 MM HG: CPT | Performed by: FAMILY MEDICINE

## 2022-11-17 PROCEDURE — 3078F DIAST BP <80 MM HG: CPT | Performed by: FAMILY MEDICINE

## 2022-11-17 RX ORDER — METHYLPREDNISOLONE 4 MG/1
TABLET ORAL
Qty: 1 KIT | Refills: 0 | Status: SHIPPED | OUTPATIENT
Start: 2022-11-17 | End: 2022-11-29 | Stop reason: ALTCHOICE

## 2022-11-17 RX ORDER — CYCLOBENZAPRINE HCL 10 MG
10 TABLET ORAL 3 TIMES DAILY PRN
Qty: 30 TABLET | Refills: 0 | Status: SHIPPED | OUTPATIENT
Start: 2022-11-17 | End: 2022-11-27

## 2022-11-17 RX ORDER — TRAMADOL HYDROCHLORIDE 50 MG/1
25-50 TABLET ORAL EVERY 8 HOURS PRN
Qty: 15 TABLET | Refills: 0 | Status: SHIPPED | OUTPATIENT
Start: 2022-11-17 | End: 2022-11-22

## 2022-11-17 NOTE — PROGRESS NOTES
Subjective  Chief Complaint   Patient presents with    Back Pain     Lower back pain down left leg, x last week, getting worse, can't get comfortable, family hx of DDD, ice, heat with some relief        James Schaffer is a 54 y.o. female    Back Pain  Patient presents for evaluation of low back problems. Symptoms have been present for 1 week and include pain in lumbosacral region   Initial inciting event: none. Aggravating factors identifiable by the patient are  pretty much all movement/sitting/lying down . She hasn't had any previous major back issues     Heating pad some help at the time, but comes back  Ice not helpful   Can't sleep   Struggling to walk/get up and down staris    She avoids NSAIDs    Tylenol some help        Patient's medications, allergies, past medical, surgical, social and family histories were reviewed and updated as appropriate. ROS  Constitutional: negative for chills, fevers, malaise and sweats  Respiratory: negative for cough, dyspnea on exertion and shortness of breath  Cardiovascular: negative for chest pain and palpitations  Musculoskeletal:positive for back pain and , negative for muscle weakness  Neurological: negative for coordination problems, gait problems, tremors, no bowel or bladder incontinence, no saddle anesthesia    Objective  /86 (Site: Left Upper Arm)   Pulse 83   Temp 96.9 °F (36.1 °C)   Ht 5' 4\" (1.626 m)   Wt 146 lb (66.2 kg)   SpO2 93%   BMI 25.06 kg/m²     limited range of motion, pain with motion noted during exam, tenderness noted left lumbar   alert, oriented, normal speech, no focal findings or movement disorder noted    Assessment   Diagnosis Orders   1. Acute left-sided low back pain with left-sided sciatica  methylPREDNISolone (MEDROL DOSEPACK) 4 MG tablet    cyclobenzaprine (FLEXERIL) 10 MG tablet    traMADol (ULTRAM) 50 MG tablet      2. Essential hypertension        3.  Family history of heart disease in male family member before age 89 [address]    Rest,Ice/Heat  NSAID's if no contraindication  Analgesics  Consider PT,and/or diagnostic workup if not better  See orders    Donald To MD

## 2022-11-18 DIAGNOSIS — I10 ESSENTIAL HYPERTENSION: ICD-10-CM

## 2022-11-21 RX ORDER — METOPROLOL SUCCINATE 25 MG/1
TABLET, EXTENDED RELEASE ORAL
Qty: 90 TABLET | Refills: 3 | Status: SHIPPED | OUTPATIENT
Start: 2022-11-21

## 2022-11-21 NOTE — TELEPHONE ENCOUNTER
Future Appointments    Encounter Information    Provider Department Appt Notes   1/26/2023 Ruth Jamil MD [facility] Return in about 1 year (around 1/25/2023) for annual checkup .   3/21/2023 Clarke Shelley, [facility] Card[facility] 1 year -Letter sent w Provider info 10/18 AA     Past Visits    Date Provider Specialty Visit Type Primary Dx   11/17/2022 Ruth Jamil MD Family Medicine Office Visit Acute left-sided low back pain with left-sided sciatica   04/22/2022 Ruth Jamil MD Family Medicine Office Visit Perimenopausal   03/29/2022 Klarissa Parks, [address] [facility] Office Visit Essential hypertension   01/25/2022 Ruth Jamil MD Family Medicine Office Visit Preventative health care

## 2022-11-29 ENCOUNTER — OFFICE VISIT (OUTPATIENT)
Dept: FAMILY MEDICINE CLINIC | Age: 52
End: 2022-11-29
Payer: COMMERCIAL

## 2022-11-29 VITALS
OXYGEN SATURATION: 98 % | DIASTOLIC BLOOD PRESSURE: 78 MMHG | WEIGHT: 142.2 LBS | HEIGHT: 64 IN | TEMPERATURE: 97.5 F | BODY MASS INDEX: 24.28 KG/M2 | HEART RATE: 107 BPM | SYSTOLIC BLOOD PRESSURE: 118 MMHG

## 2022-11-29 DIAGNOSIS — M54.42 ACUTE LEFT-SIDED LOW BACK PAIN WITH LEFT-SIDED SCIATICA: Primary | ICD-10-CM

## 2022-11-29 DIAGNOSIS — M54.16 LUMBAR RADICULOPATHY: ICD-10-CM

## 2022-11-29 DIAGNOSIS — M54.42 ACUTE LEFT-SIDED LOW BACK PAIN WITH LEFT-SIDED SCIATICA: ICD-10-CM

## 2022-11-29 LAB
BASOPHILS ABSOLUTE: 0.1 K/UL (ref 0–0.2)
BASOPHILS RELATIVE PERCENT: 0.5 %
EOSINOPHILS ABSOLUTE: 0.1 K/UL (ref 0–0.7)
EOSINOPHILS RELATIVE PERCENT: 0.4 %
HCT VFR BLD CALC: 44.6 % (ref 37–47)
HEMOGLOBIN: 15 G/DL (ref 12–16)
LYMPHOCYTES ABSOLUTE: 2.7 K/UL (ref 1–4.8)
LYMPHOCYTES RELATIVE PERCENT: 16.8 %
MCH RBC QN AUTO: 31.8 PG (ref 27–31.3)
MCHC RBC AUTO-ENTMCNC: 33.6 % (ref 33–37)
MCV RBC AUTO: 94.7 FL (ref 79.4–94.8)
MONOCYTES ABSOLUTE: 1.1 K/UL (ref 0.2–0.8)
MONOCYTES RELATIVE PERCENT: 7 %
NEUTROPHILS ABSOLUTE: 11.9 K/UL (ref 1.4–6.5)
NEUTROPHILS RELATIVE PERCENT: 75.3 %
PDW BLD-RTO: 13.9 % (ref 11.5–14.5)
PLATELET # BLD: 477 K/UL (ref 130–400)
RBC # BLD: 4.72 M/UL (ref 4.2–5.4)
SEDIMENTATION RATE, ERYTHROCYTE: 34 MM (ref 0–20)
WBC # BLD: 15.8 K/UL (ref 4.8–10.8)

## 2022-11-29 PROCEDURE — 99214 OFFICE O/P EST MOD 30 MIN: CPT | Performed by: FAMILY MEDICINE

## 2022-11-29 PROCEDURE — 3074F SYST BP LT 130 MM HG: CPT | Performed by: FAMILY MEDICINE

## 2022-11-29 PROCEDURE — 3078F DIAST BP <80 MM HG: CPT | Performed by: FAMILY MEDICINE

## 2022-11-29 ASSESSMENT — ENCOUNTER SYMPTOMS
ABDOMINAL DISTENTION: 0
ABDOMINAL PAIN: 0
PHOTOPHOBIA: 0
SHORTNESS OF BREATH: 0
BACK PAIN: 1
CHEST TIGHTNESS: 0

## 2022-11-29 NOTE — PROGRESS NOTES
Diagnosis Orders   1. Acute left-sided low back pain with left-sided sciatica  XR LUMBAR SPINE (2-3 VIEWS)    XR LUMBAR SPINE FLEXION AND EXTENSION ONLY    CBC with Auto Differential    Sedimentation Rate    MARC Screen With Reflex    Rheumatiod Arthritis Diagnostic Panel    [facility] [place]      2. Lumbar radiculopathy  XR LUMBAR SPINE (2-3 VIEWS)    XR LUMBAR SPINE FLEXION AND EXTENSION ONLY    CBC with Auto Differential    Sedimentation Rate    MARC Screen With Reflex    Rheumatiod Arthritis Diagnostic Panel    [facility] [place]        Return for Based on test results. Patient Instructions   X-rays will be done to look for evidence of degenerative disc disease or spondylolisthesis that may be leading to worsening of symptoms. MRI might be indicated. No treatment change at this time as patient is not interested in trying more medications. Physical therapy has been ordered to help symptomatically. Subjective:      Patient ID: Alvaro Parmar is a 54 y.o. female who presents for:  Chief Complaint   Patient presents with    Back Pain     Seen DR Eleonora Jackson 11/17/2022       Patient states first noted symptoms when she woke up 1 day that her back just did not feel quite right. As the day went on she noted increasing difficulty with back activity and also radiation of pain down her left leg. She came in for treatment by her primary care provider with steroids, muscle relaxant, pain medication and feels there has been Apsley no improvement. Her  would agree with this. He states that she witnesses her moving around all he sees is her getting worse. Patient states now she is noticing a lot of pain across her entire pelvis. Still radiation of pain down the left leg is present. Even when she moves things like her upper extremities to reach for things it sets off pain in the back. She denies any involvement of the shoulder girdle.   Denies any history of inflammatory disease in the family or for herself. She has noted 7 pound weight loss that she thinks may be related to pain and a decreased appetite. Current Outpatient Medications on File Prior to Visit   Medication Sig Dispense Refill    metoprolol succinate (TOPROL XL) 25 MG extended release tablet TAKE 1 TABLET NIGHTLY 90 tablet 3    atorvastatin (LIPITOR) 10 MG tablet Take 1 tablet by mouth daily 90 tablet 2    lisinopril-hydroCHLOROthiazide (PRINZIDE;ZESTORETIC) 20-12.5 MG per tablet Take 2 tablets by mouth daily 180 tablet 3    Coenzyme Q10 (CO Q 10 PO) Take by mouth      Omega-3 Fatty Acids (FISH OIL) 1000 MG CAPS Take 3,000 mg by mouth 3 times daily      Multiple Vitamins-Minerals (MULTI COMPLETE PO) Take  by mouth. No current facility-administered medications on file prior to visit.      Past Medical History:   Diagnosis Date    Hypertension     Mitral valve prolapse     Pre-eclampsia     Routine gynecological examination     SYLVESTER Bernal     Past Surgical History:   Procedure Laterality Date     SECTION       Social History     Socioeconomic History    Marital status:      Spouse name: Not on file    Number of children: Not on file    Years of education: Not on file    Highest education level: Not on file   Occupational History    Not on file   Tobacco Use    Smoking status: Never    Smokeless tobacco: Never   Substance and Sexual Activity    Alcohol use: No    Drug use: No    Sexual activity: Yes     Partners: Male   Other Topics Concern    Not on file   Social History Narrative    Not on file     Social Determinants of Health     Financial Resource Strain: Low Risk     Difficulty of Paying Living Expenses: Not hard at all   Food Insecurity: No Food Insecurity    Worried About [address] in the Last Year: Never true    [address] in the Last Year: Never true   Transportation Needs: No Transportation Needs    Lack of Transportation (Medical): No    Lack of Transportation (Non-Medical): No   Physical Activity: Not on file   Stress: Not on file   Social Connections: Not on file   Intimate Partner Violence: Not on file   Housing Stability: Not on file     Family History   Problem Relation Age of Onset    Heart Disease Brother 46        MI    Other Maternal Grandfather 50        MI     Allergies:  Patient has no known allergies. Review of Systems   Constitutional:  Positive for unexpected weight change. Negative for activity change, appetite change, chills, diaphoresis and fever. Eyes:  Negative for photophobia and visual disturbance. Respiratory:  Negative for chest tightness and shortness of breath. No orthopnea   Cardiovascular:  Negative for chest pain, palpitations and leg swelling. Gastrointestinal:  Negative for abdominal distention and abdominal pain. Genitourinary:  Negative for flank pain and frequency. Musculoskeletal:  Positive for back pain. Negative for gait problem, joint swelling, neck pain and neck stiffness. Neurological:  Negative for dizziness, weakness, light-headedness and headaches. Psychiatric/Behavioral:  Negative for confusion. Objective:   /78   Pulse (!) 107   Temp 97.5 °F (36.4 °C)   Ht 5' 4\" (1.626 m)   Wt 142 lb 3.2 oz (64.5 kg)   SpO2 98%   BMI 24.41 kg/m²     Physical Exam  Constitutional:       General: She is not in acute distress. Appearance: She is well-developed. She is not diaphoretic. HENT:      Head: Normocephalic and atraumatic. Right Ear: External ear normal.      Left Ear: External ear normal.      Nose: Nose normal.   Eyes:      General:         Right eye: No discharge. Left eye: No discharge. Conjunctiva/sclera: Conjunctivae normal.      Pupils: Pupils are equal, round, and reactive to light. Neck:      Thyroid: No thyromegaly. Trachea: No tracheal deviation. Cardiovascular:      Rate and Rhythm: Normal rate and regular rhythm.    Pulmonary:      Effort: Pulmonary effort is normal. No respiratory distress. Abdominal:      General: There is no distension. Musculoskeletal:      Cervical back: Neck supple. Skin:     General: Skin is warm and dry. Findings: No bruising or rash. Neurological:      Mental Status: She is alert and oriented to person, place, and time. Motor: No weakness. Coordination: Coordination normal.      Gait: Gait normal.      Comments: Muscle strength testing bilateral lower extremities 5 out of 5 equally   Psychiatric:         Thought Content: Thought content normal.         Judgment: Judgment normal.       No results found for this visit on 11/29/22. No results found for this or any previous visit (from the past 2016 hour(s)). [] Pt was seen by provider for      Minutes  Counseling and coordination of care was done for all assessment diagnosis listed for today with patient and any family/friend present. More than 50% of this visit was spent coordinating current care, obtaining information for prior records, and counseling for current plan of action. Assessment:       Diagnosis Orders   1. Acute left-sided low back pain with left-sided sciatica  XR LUMBAR SPINE (2-3 VIEWS)    XR LUMBAR SPINE FLEXION AND EXTENSION ONLY    CBC with Auto Differential    Sedimentation Rate    MARC Screen With Reflex    Rheumatiod Arthritis Diagnostic Panel    [facility] [place]      2.  Lumbar radiculopathy  XR LUMBAR SPINE (2-3 VIEWS)    XR LUMBAR SPINE FLEXION AND EXTENSION ONLY    CBC with Auto Differential    Sedimentation Rate    MARC Screen With Reflex    Rheumatiod Arthritis Diagnostic Panel    [facility] [place]            Orders Placed This Encounter   Procedures    XR LUMBAR SPINE (2-3 VIEWS)     Standing Status:   Future     Number of Occurrences:   1     Standing Expiration Date:   11/29/2023    XR LUMBAR SPINE FLEXION AND EXTENSION ONLY     Standing Status:   Future     Number more medications. Physical therapy has been ordered to help symptomatically. This note was partially created with the assistance of dictation. This may lead to grammatical or spelling errors. Cristobal Patton M.D.

## 2022-11-29 NOTE — PATIENT INSTRUCTIONS
X-rays will be done to look for evidence of degenerative disc disease or spondylolisthesis that may be leading to worsening of symptoms. MRI might be indicated. No treatment change at this time as patient is not interested in trying more medications. Physical therapy has been ordered to help symptomatically.

## 2022-11-30 DIAGNOSIS — R70.0 ELEVATED SED RATE: ICD-10-CM

## 2022-11-30 DIAGNOSIS — R93.89 ABNORMAL X-RAY: Primary | ICD-10-CM

## 2022-11-30 NOTE — RESULT ENCOUNTER NOTE
Order MRI of the lumbar spine due to abnormality of lumbar vertebrae noted by x-ray. Also elevated sedimentation rate. Urgent    Notify patient. We are going to try to get her in for an MRI soon as possible. Find out if patient has any other ill symptoms-cough cold, urinary burning, sinus pain?

## 2022-12-01 ENCOUNTER — HOSPITAL ENCOUNTER (OUTPATIENT)
Dept: MRI IMAGING | Age: 52
Discharge: HOME | End: 2022-12-03
Payer: COMMERCIAL

## 2022-12-01 DIAGNOSIS — R70.0 ELEVATED SED RATE: ICD-10-CM

## 2022-12-01 DIAGNOSIS — R93.89 ABNORMAL X-RAY: ICD-10-CM

## 2022-12-01 PROCEDURE — A9579 GAD-BASE MR CONTRAST NOS,1ML: HCPCS | Performed by: FAMILY MEDICINE

## 2022-12-01 PROCEDURE — 72158 MRI LUMBAR SPINE W/O & W/DYE: CPT

## 2022-12-01 PROCEDURE — 6360000004 HC RX CONTRAST MEDICATION: Performed by: FAMILY MEDICINE

## 2022-12-01 RX ADMIN — GADOTERIDOL 15 ML: 279.3 INJECTION, SOLUTION INTRAVENOUS at 11:15

## 2022-12-01 NOTE — RESULT ENCOUNTER NOTE
Forward the lumbar result with the cervical result for the consult with neurosurgeon. Notify patient there are small abnormalities in the lumbar testing. Neck is more significant findings. Also an ovarian cyst noted but within a size range not recommending follow-up.

## 2022-12-01 NOTE — RESULT ENCOUNTER NOTE
Error regarding cervical abnl. Only lumbar testing done.   Refer to neurosurgeon to discuss possible options for treatment and notify pt finding of ovarian cyst, however, no need to follow as is in normal size range for non-menopausal person

## 2022-12-02 ENCOUNTER — TELEPHONE (OUTPATIENT)
Dept: FAMILY MEDICINE CLINIC | Age: 52
End: 2022-12-02

## 2022-12-02 NOTE — TELEPHONE ENCOUNTER
Per Ryan Roque, pt aware that lab results may not be completed until Mon. I do not see Neurology referral in chart? Did you want that ordered?      Please send to JAMES due to Tai Vasquez being out of office, thanks

## 2022-12-02 NOTE — TELEPHONE ENCOUNTER
Pt calling for results of MARC and Rheumatoid Arthritis panel and wanted to know which neurosurgeon will she be seeing as well pt would like a call back with information please and thank you.   [phone]

## 2022-12-03 LAB — ANA IGG, ELISA: NORMAL

## 2022-12-03 NOTE — TELEPHONE ENCOUNTER
Pharmacy is requesting medication refill.  Please approve or deny this request.    Rx requested:  Requested Prescriptions     Pending Prescriptions Disp Refills    atorvastatin (LIPITOR) 10 MG tablet [Pharmacy Med Name: ATORVASTATIN TAB 10MG] 90 tablet 2     Sig: Take 1 tablet by mouth daily         Last Office Visit:   3/29/2022      Next Visit Date:  Future Appointments   Date Time Provider Santosh Julien   12/15/2022 11:00 AM MLOZ VERMILION PT Andrea 55  PT [address] [place] [address]   1/26/2023  9:30 AM Adal Duron MD [facility]   3/21/2023 11:00 AM Triny Ruiz DO [address]

## 2022-12-05 ENCOUNTER — TELEPHONE (OUTPATIENT)
Dept: FAMILY MEDICINE CLINIC | Age: 52
End: 2022-12-05

## 2022-12-05 LAB
CARBAMYLATED PROTEIN (CARP) ANTIBODY, IGG: 5 UNITS (ref 0–19)
CCP IGG ANTIBODIES: 8 UNITS (ref 0–19)
RHEUMATOID FACTOR: <10 IU/ML (ref 0–14)

## 2022-12-05 RX ORDER — ATORVASTATIN CALCIUM 10 MG/1
10 TABLET, FILM COATED ORAL DAILY
Qty: 90 TABLET | Refills: 2 | Status: SHIPPED | OUTPATIENT
Start: 2022-12-05

## 2022-12-05 NOTE — TELEPHONE ENCOUNTER
Spoke with pt and she wanted to know if we should wait on putting the referral for Neuro in until she sees Infectious disease doctor?

## 2022-12-05 NOTE — TELEPHONE ENCOUNTER
Pt calling to let provider know which neuro she would like, pt needs referral faxed over to their office. Facility is   [facility]   Fax [phone]   Does not have a specific doctor but any doctor will do they just need referral in order to schedule pt.

## 2022-12-07 ENCOUNTER — TELEPHONE (OUTPATIENT)
Dept: FAMILY MEDICINE CLINIC | Age: 52
End: 2022-12-07

## 2022-12-07 ENCOUNTER — TELEPHONE (OUTPATIENT)
Dept: INFECTIOUS DISEASES | Age: 52
End: 2022-12-07

## 2022-12-07 NOTE — TELEPHONE ENCOUNTER
Dr Rajput Gloss office calling for the status of Referral to ID. Advised this is still in review. Labs and MRI to be reviewed. Patient will be called once the ID Physician gives recommendations.

## 2022-12-07 NOTE — TELEPHONE ENCOUNTER
PT calling to see if they can just send over referral For neuro at the [facility]; pt has email from provider stating that she can go head and do both. Infec and neuro.

## 2022-12-08 ENCOUNTER — TELEMEDICINE (OUTPATIENT)
Dept: FAMILY MEDICINE CLINIC | Age: 52
End: 2022-12-08
Payer: COMMERCIAL

## 2022-12-08 DIAGNOSIS — D72.828 OTHER ELEVATED WHITE BLOOD CELL (WBC) COUNT: ICD-10-CM

## 2022-12-08 DIAGNOSIS — R70.0 ELEVATED SED RATE: Primary | ICD-10-CM

## 2022-12-08 DIAGNOSIS — R70.0 ELEVATED SED RATE: ICD-10-CM

## 2022-12-08 DIAGNOSIS — I10 ESSENTIAL HYPERTENSION: ICD-10-CM

## 2022-12-08 DIAGNOSIS — M54.16 LUMBAR RADICULOPATHY: ICD-10-CM

## 2022-12-08 DIAGNOSIS — M54.42 ACUTE LEFT-SIDED LOW BACK PAIN WITH LEFT-SIDED SCIATICA: ICD-10-CM

## 2022-12-08 DIAGNOSIS — R93.89 ABNORMAL X-RAY: Primary | ICD-10-CM

## 2022-12-08 PROCEDURE — 99214 OFFICE O/P EST MOD 30 MIN: CPT | Performed by: FAMILY MEDICINE

## 2022-12-08 ASSESSMENT — ENCOUNTER SYMPTOMS
ABDOMINAL PAIN: 0
PHOTOPHOBIA: 0
BACK PAIN: 1
CHEST TIGHTNESS: 0
SHORTNESS OF BREATH: 0
ABDOMINAL DISTENTION: 0

## 2022-12-08 ASSESSMENT — VISUAL ACUITY: OU: 1

## 2022-12-08 NOTE — PATIENT INSTRUCTIONS
Patient will continue with chiropractor. Neurosurgical consult placed to evaluate for surgical options. Follow-up blood work ordered for next week to be sure patient normalizes.     Patient will start utilizing bedtime cyclobenzaprine

## 2022-12-08 NOTE — TELEPHONE ENCOUNTER
Notify patient a new order has been created for CBC and blood work related to the fact that some of her blood work was abnormal.  She shows no signs of infection by conversation or complaint so I would like to recheck the blood work to be sure that it is normalized. In addition, I see a note [facility]. Are they handling her back pain? Or do we need to send to pain management?

## 2022-12-08 NOTE — PROGRESS NOTES
Diagnosis Orders   1. Abnormal x-ray        2. Elevated sed rate        3. Acute left-sided low back pain with left-sided sciatica  Bernard Colon MD, [facility]      4. Lumbar radiculopathy  Carleen Sabillon MD, Neurosurgery [place]            Orders Placed This Encounter   Procedures    Bernard Colon MD, Neurosurgery [place]     Referral Priority:   Routine     Referral Type:   Eval and Treat     Referral Reason:   Specialty Services Required     Referred to Provider:   Mary Kay Valentine MD     Requested Specialty:   Neurosurgery     Number of Visits Requested:   1       Return for Based on test results. Patient Instructions   Patient will continue with chiropractor. Neurosurgical consult placed to evaluate for surgical options. Follow-up blood work ordered for next week to be sure patient normalizes. Patient will start utilizing bedtime cyclobenzaprine    This visit began at 3:52pm      The location for this appointment was the [facility] primary care site. TELEHEALTH APPOINTMENT  Patient has been screened to determine that this visit qualifies for a \"Video Visit\". This visit was via video due to the restrictions of the COVID-19 pandemic. All issues as below were discussed and addressed but note a limited visually based physical exam was performed. If it was felt the patient should be evaluated in the clinic there will be comment below demonstrating they were directed there. The patient is aware and has given verbal consent to be billed for this video encounter. The resources used for this visit were [employer] for chart access and [employer]. me    Chief Complaint   Patient presents with    Back Pain     Discuss all that has gone on in the last 2 days       Video visit to review all patient testing and continue with plan. She states she went to the chiropractor today and they did some positioning and some muscle release that has helped quite a bit. She is open to using the muscle relaxant now. She has not been contacted by neurosurgery or neurology to schedule any sort of injection though we reviewed there is a abstract note on 2022 showing that somebody was in the chart evaluating for potential treatment options and made a recommendation. Patient also states she was contacted by infectious disease but has not listen to the message yet. I reviewed the lab work from her emergency room visit on 2022 which does demonstrate improving inflammatory markers so less suspicious for infectious process at this time. PMH:    Current Outpatient Medications on File Prior to Visit   Medication Sig Dispense Refill    atorvastatin (LIPITOR) 10 MG tablet Take 1 tablet by mouth daily 90 tablet 2    metoprolol succinate (TOPROL XL) 25 MG extended release tablet TAKE 1 TABLET NIGHTLY 90 tablet 3    lisinopril-hydroCHLOROthiazide (PRINZIDE;ZESTORETIC) 20-12.5 MG per tablet Take 2 tablets by mouth daily 180 tablet 3    Coenzyme Q10 (CO Q 10 PO) Take by mouth      Omega-3 Fatty Acids (FISH OIL) 1000 MG CAPS Take 3,000 mg by mouth 3 times daily      Multiple Vitamins-Minerals (MULTI COMPLETE PO) Take  by mouth. No current facility-administered medications on file prior to visit.      Past Medical History:   Diagnosis Date    Hypertension     Mitral valve prolapse     Pre-eclampsia     Routine gynecological examination     SYLVESTER Ledesma     Past Surgical History:   Procedure Laterality Date     SECTION       Social History     Socioeconomic History    Marital status:      Spouse name: Not on file    Number of children: Not on file    Years of education: Not on file    Highest education level: Not on file   Occupational History    Not on file   Tobacco Use    Smoking status: Never    Smokeless tobacco: Never   Substance and Sexual Activity    Alcohol use: No    Drug use: No    Sexual activity: Yes     Partners: Male   Other Topics Concern    Not on file   Social History Narrative    Not on file     Social Determinants of Health     Financial Resource Strain: Low Risk     Difficulty of Paying Living Expenses: Not hard at all   Food Insecurity: No Food Insecurity    Worried About [address] in the Last Year: Never true    [facility] in the Last Year: Never true   Transportation Needs: No Transportation Needs    Lack of Transportation (Medical): No    Lack of Transportation (Non-Medical): No   Physical Activity: Not on file   Stress: Not on file   Social Connections: Not on file   Intimate Partner Violence: Not on file   Housing Stability: Not on file     Family History   Problem Relation Age of Onset    Heart Disease Brother 46        MI    Other Maternal Grandfather 50        MI     Allergies:  Patient has no known allergies. Review of Systems   Constitutional:  Negative for activity change, appetite change, diaphoresis and unexpected weight change. Eyes:  Negative for photophobia and visual disturbance. Respiratory:  Negative for chest tightness and shortness of breath. No orthopnea   Cardiovascular:  Negative for chest pain, palpitations and leg swelling. Gastrointestinal:  Negative for abdominal distention and abdominal pain. Genitourinary:  Negative for flank pain and frequency. Musculoskeletal:  Positive for back pain. Negative for gait problem and joint swelling. Neurological:  Negative for dizziness, weakness, light-headedness and headaches. Psychiatric/Behavioral:  Negative for confusion.       PHYSICAL EXAM/ RESULTS    (Limited exam: only performed what is available through a visual exam during the video encounter as indicated due to the restrictions of the COVID-19 pandemic)    Patient-Reported Vitals 12/8/2022   Patient-Reported Weight 141.4 lb   Patient-Reported Height 5 4   Patient-Reported Systolic (No Data)   Patient-Reported Pulse (No Data)   Patient-Reported Temperature 97.9 Gait: Gait is intact. Psychiatric:         Attention and Perception: Attention and perception normal.         Mood and Affect: Mood and affect normal.         Speech: Speech normal.         Behavior: Behavior normal.         Thought Content: Thought content normal.         Judgment: Judgment normal.       Recent Results (from the past 2016 hour(s))   Rheumatiod Arthritis Diagnostic Panel    Collection Time: 11/29/22  2:13 PM   Result Value Ref Range    Rheumatoid Factor <10 0 - 14 IU/mL    CCP IgG Antibodies 8 0 - 19 Units    Carbamylated Protein (CarP) Antibody, IgG 5 0 - 19 Units   MARC Screen With Reflex    Collection Time: 11/29/22  2:13 PM   Result Value Ref Range    MARC Ab, IgG JHONATHAN None Detected None Detected   Sedimentation Rate    Collection Time: 11/29/22  2:13 PM   Result Value Ref Range    Sed Rate 34 (H) 0 - 20 mm   CBC with Auto Differential    Collection Time: 11/29/22  2:13 PM   Result Value Ref Range    WBC 15.8 (H) 4.8 - 10.8 K/uL    RBC 4.72 4.20 - 5.40 M/uL    Hemoglobin 15.0 12.0 - 16.0 g/dL    Hematocrit 44.6 37.0 - 47.0 %    MCV 94.7 79.4 - 94.8 fL    MCH 31.8 (H) 27.0 - 31.3 pg    MCHC 33.6 33.0 - 37.0 %    RDW 13.9 11.5 - 14.5 %    Platelets 115 (H) 905 - 400 K/uL    Neutrophils % 75.3 %    Lymphocytes % 16.8 %    Monocytes % 7.0 %    Eosinophils % 0.4 %    Basophils % 0.5 %    Neutrophils Absolute 11.9 (H) 1.4 - 6.5 K/uL    Lymphocytes Absolute 2.7 1.0 - 4.8 K/uL    Monocytes Absolute 1.1 (H) 0.2 - 0.8 K/uL    Eosinophils Absolute 0.1 0.0 - 0.7 K/uL    Basophils Absolute 0.1 0.0 - 0.2 K/uL       [] Pt was seen by provider for  Minutes  Counseling and coordination of care was done for all assessment diagnosis listed for today with patient and any family/friend present. More than 50% of this visit was spent coordinating cuurent care, obtaining information for prior records, and counseling for current plan of action. Assessment:       Diagnosis Orders   1.  Abnormal x-ray 2. Elevated sed rate        3. Acute left-sided low back pain with left-sided sciatica  Griffin Sterling MD, Neurosurgery, [place]      4. [facility]            Orders Placed This Encounter   Procedures    Griffin Sterling MD, Neurosurgery, [place]     Referral Priority:   Routine     Referral Type:   Eval and Treat     Referral Reason:   Specialty Services Required     Referred to Provider:   iCndy Aaron MD     Requested Specialty:   Neurosurgery     Number of Visits Requested:   1         Plan:   Return for Based on test results. Patient Instructions   Patient will continue with chiropractor. Neurosurgical consult placed to evaluate for surgical options. Follow-up blood work ordered for next week to be sure patient normalizes. Patient will start utilizing bedtime cyclobenzaprine    This visit ended at 4:05pm    The resources used for this visit were [employer] for chart access and [employer]. me      Bashir Mercado M.D. An  electronic signature was used to authenticate this note. --Nickolas Reis MD on 12/8/2022 at 5:24 PM    This evaluation was performed through a synchronous (real-time) audio-video encounter. The patient (or guardian if applicable) is aware that this is a billable service, which includes applicable co-pays. This virtual visit was conducted with patient's (and/or legal guardians) consent. The visit was conducted pursuant to the emergency declaration under the [facility], [facility]5 waiver authority and the [employer] Supplemental Appropriation's Act. The patient identification was verified, and the caregiver was present when appropriate. The patient was located in the [facility] where the provider was licensed to provide care.

## 2022-12-09 ENCOUNTER — TELEPHONE (OUTPATIENT)
Dept: FAMILY MEDICINE CLINIC | Age: 52
End: 2022-12-09

## 2022-12-09 NOTE — TELEPHONE ENCOUNTER
Pt states referral is going to the wrong facility and she has an appt with [facility] on December 21 and need referral and all information faxed or she said she can  referral just as long as it is in the right place    Provider for Neuro is Jaime Gooden,[address] with [facility]  Fax [phone]

## 2022-12-14 NOTE — TELEPHONE ENCOUNTER
Pt states she has decided to keep both for Neuro. Pt said something as important a spine should have 2nd opinion.  She will go to [facility] neuro and [facility] neuro

## 2022-12-21 DIAGNOSIS — I10 ESSENTIAL HYPERTENSION: ICD-10-CM

## 2022-12-22 RX ORDER — LISINOPRIL AND HYDROCHLOROTHIAZIDE 20; 12.5 MG/1; MG/1
TABLET ORAL
Qty: 180 TABLET | Refills: 3 | Status: SHIPPED | OUTPATIENT
Start: 2022-12-22

## 2022-12-22 NOTE — TELEPHONE ENCOUNTER
Future Appointments    Encounter Information    Provider Department Appt Notes   1/24/2023 Sarah Ramírez MD [facility] Neurosurgery C: S40.73 (ICD-10-CM) - Acute left-sided low back pain with left-sided sciatica   M54.16 (ICD-10-CM) - Lumbar radiculopathy   1/26/2023 Pam Suárez MD [facility] Primary [facility] Return in about 1 year (around 1/25/2023) for annual checkup .   3/21/2023 Graham Shabazz, [address] [place] [address] Cardiology 1 year -Letter sent w Provider info 10/18 AA     Past Visits    Date Provider Specialty Visit Type Primary Dx   12/08/2022 Erick Becerra MD Family Medicine Telemedicine Abnormal x-ray   11/29/2022 Erick Becerra MD Family Medicine Office Visit Acute left-sided low back pain with left-sided sciatica

## 2023-02-27 ENCOUNTER — OFFICE VISIT (OUTPATIENT)
Dept: FAMILY MEDICINE CLINIC | Age: 53
End: 2023-02-27
Payer: COMMERCIAL

## 2023-02-27 VITALS
SYSTOLIC BLOOD PRESSURE: 120 MMHG | WEIGHT: 145.2 LBS | HEIGHT: 64 IN | BODY MASS INDEX: 24.79 KG/M2 | DIASTOLIC BLOOD PRESSURE: 70 MMHG | OXYGEN SATURATION: 97 % | HEART RATE: 79 BPM | TEMPERATURE: 98.2 F

## 2023-02-27 DIAGNOSIS — I10 ESSENTIAL HYPERTENSION: ICD-10-CM

## 2023-02-27 DIAGNOSIS — Z12.11 SCREEN FOR COLON CANCER: ICD-10-CM

## 2023-02-27 DIAGNOSIS — E55.9 HYPOVITAMINOSIS D: ICD-10-CM

## 2023-02-27 DIAGNOSIS — Z00.00 PREVENTATIVE HEALTH CARE: Primary | ICD-10-CM

## 2023-02-27 PROCEDURE — 3078F DIAST BP <80 MM HG: CPT | Performed by: FAMILY MEDICINE

## 2023-02-27 PROCEDURE — 3074F SYST BP LT 130 MM HG: CPT | Performed by: FAMILY MEDICINE

## 2023-02-27 PROCEDURE — 99396 PREV VISIT EST AGE 40-64: CPT | Performed by: FAMILY MEDICINE

## 2023-02-27 RX ORDER — METOPROLOL SUCCINATE 25 MG/1
TABLET, EXTENDED RELEASE ORAL
Qty: 90 TABLET | Refills: 3 | Status: SHIPPED | OUTPATIENT
Start: 2023-02-27

## 2023-02-27 RX ORDER — LISINOPRIL AND HYDROCHLOROTHIAZIDE 20; 12.5 MG/1; MG/1
TABLET ORAL
Qty: 180 TABLET | Refills: 3 | Status: SHIPPED | OUTPATIENT
Start: 2023-02-27

## 2023-02-27 RX ORDER — ATORVASTATIN CALCIUM 10 MG/1
10 TABLET, FILM COATED ORAL DAILY
Qty: 90 TABLET | Refills: 3 | Status: SHIPPED | OUTPATIENT
Start: 2023-02-27

## 2023-02-27 SDOH — ECONOMIC STABILITY: HOUSING INSECURITY
IN THE LAST 12 MONTHS, WAS THERE A TIME WHEN YOU DID NOT HAVE A STEADY PLACE TO SLEEP OR SLEPT IN A SHELTER (INCLUDING NOW)?: NO

## 2023-02-27 SDOH — ECONOMIC STABILITY: INCOME INSECURITY: HOW HARD IS IT FOR YOU TO PAY FOR THE VERY BASICS LIKE FOOD, HOUSING, MEDICAL CARE, AND HEATING?: NOT HARD AT ALL

## 2023-02-27 SDOH — ECONOMIC STABILITY: FOOD INSECURITY: WITHIN THE PAST 12 MONTHS, THE FOOD YOU BOUGHT JUST DIDN'T LAST AND YOU DIDN'T HAVE MONEY TO GET MORE.: NEVER TRUE

## 2023-02-27 SDOH — ECONOMIC STABILITY: FOOD INSECURITY: WITHIN THE PAST 12 MONTHS, YOU WORRIED THAT YOUR FOOD WOULD RUN OUT BEFORE YOU GOT MONEY TO BUY MORE.: NEVER TRUE

## 2023-02-27 ASSESSMENT — PATIENT HEALTH QUESTIONNAIRE - PHQ9
1. LITTLE INTEREST OR PLEASURE IN DOING THINGS: 0
SUM OF ALL RESPONSES TO PHQ QUESTIONS 1-9: 1
SUM OF ALL RESPONSES TO PHQ9 QUESTIONS 1 & 2: 1
SUM OF ALL RESPONSES TO PHQ QUESTIONS 1-9: 1
2. FEELING DOWN, DEPRESSED OR HOPELESS: 1

## 2023-02-27 NOTE — PROGRESS NOTES
Subjective:      Patient ID: Clyde Ness is a 50 y.o. female. Chief Complaint   Patient presents with    Annual Exam     Yearly     Health Maintenance     Does mamm at [facility] scheduled        HPI   Clyde Ness is a 50 y.o. female    Refills/checkup    Nothing new to report     Strong cardiac family history    Has annual checkup with cardiology on Friday     Louy will be having back surgery     Hypertension: Patient here for follow-up of elevated blood pressure. She is not formally exercising but is active and is adherent to low salt diet. Blood pressure is well controlled at home. Cardiac symptoms none. Patient denies  chest pressure/discomfort, claudication, dyspnea, exertional chest pressure/discomfort, fatigue, irregular heart beat, lower extremity edema, near-syncope, orthopnea, palpitations, paroxysmal nocturnal dyspnea, syncope and tachypnea. Cardiovascular risk factors: dyslipidemia, family history of premature cardiovascular disease and hypertension. Use of agents associated with hypertension: none. History of target organ damage: none.       Past Medical History:   Diagnosis Date    Hypertension     Mitral valve prolapse     Pre-eclampsia     [facility]     Past Surgical History:   Procedure Laterality Date     SECTION       Family History   Problem Relation Age of Onset    Heart Disease Brother 46        MI    Other Maternal Grandfather 50        MI     Social History     Socioeconomic History    Marital status:      Spouse name: None    Number of children: None    Years of education: None    Highest education level: None   Tobacco Use    Smoking status: Never    Smokeless tobacco: Never   Substance and Sexual Activity    Alcohol use: No    Drug use: No    Sexual activity: Yes     Partners: Male     Social Determinants of Health     Financial Resource Strain: Low Risk     Difficulty of Paying Living Expenses: Not hard at all   Food Insecurity: No Food Insecurity    Worried About Running Out of Food in the Last Year: Never true    Ran Out of Food in the Last Year: Never true   Transportation Needs: Unknown    Lack of Transportation (Non-Medical): No   Housing Stability: Unknown    Unstable Housing in the Last Year: No     Current Outpatient Medications   Medication Sig Dispense Refill    atorvastatin (LIPITOR) 10 MG tablet Take 1 tablet by mouth daily 90 tablet 3    lisinopril-hydroCHLOROthiazide (PRINZIDE;ZESTORETIC) 20-12.5 MG per tablet TAKE 2 TABLETS DAILY 180 tablet 3    metoprolol succinate (TOPROL XL) 25 MG extended release tablet TAKE 1 TABLET NIGHTLY 90 tablet 3    Coenzyme Q10 (CO Q 10 PO) Take by mouth      Omega-3 Fatty Acids (FISH OIL) 1000 MG CAPS Take 3,000 mg by mouth 3 times daily      Multiple Vitamins-Minerals (MULTI COMPLETE PO) Take  by mouth. No current facility-administered medications for this visit. No Known Allergies      Review of Systems:   General ROS: negative for - nausea, vomiting, chills, fatigue, fever, malaise, weight gain or weight loss  Respiratory ROS: no cough, shortness of breath, or wheezing  Cardiovascular ROS: no chest pain or dyspnea on exertion  Gastrointestinal ROS: no abdominal pain, change in bowel habits, or black or bloody stools  Genito-Urinary ROS: no dysuria, trouble voiding, or hematuria  Musculoskeletal ROS: negative for - gait disturbance, joint pain or joint stiffness  Neurological ROS: negative for - behavioral changes, memory loss, numbness/tingling, tremors or weakness    In general patient otherwise reports feeling well.        Objective:   Physical Exam:  /70 (Site: Left Upper Arm)   Pulse 79   Temp 98.2 °F (36.8 °C)   Ht 5' 4\" (1.626 m)   Wt 145 lb 3.2 oz (65.9 kg)   SpO2 97%   BMI 24.92 kg/m²     Gen: Well, NAD, Alert, Oriented x 3   HEENT: EOMI, eyes clear, MMM  Skin: without rash or jaundice  Neck: no significant lymphadenopathy or thyromegaly  Lungs: CTA B w/out Rales/Wheezes/Rhonchi, Good respiratory effort   Heart: RRR, S1S2, w/out M/R/G, non-displaced PMI     Ext: No C/C/E Bilaterally. Psych: euthymic        Assessment:       Diagnosis Orders   1. Preventative health care        2. Essential hypertension  lisinopril-hydroCHLOROthiazide (PRINZIDE;ZESTORETIC) 20-12.5 MG per tablet    metoprolol succinate (TOPROL XL) 25 MG extended release tablet      3. Screen for colon cancer  Fecal DNA Colorectal cancer screening (Cologuard)      4. Hypovitaminosis D                 Plan:   Continue current treatment regimen. Continue current medications. Dietary sodium restriction. Regular aerobic exercise. Reduce stress    Follows with GYN    Ok for spine surgery     Updated health maintenance.     Continue Daily ASA recommended omega 3 fatty acids    Return for fasting labs          José Roberts MD

## 2023-03-01 ENCOUNTER — TELEPHONE (OUTPATIENT)
Dept: CARDIOLOGY CLINIC | Age: 53
End: 2023-03-01

## 2023-03-01 NOTE — TELEPHONE ENCOUNTER
Appointment For: Rolan Patel ([id number])   Visit Type: FOLLOW UP [phone])      4/25/2023   12:00 PM  20 mins. Dr. Jack Wei, DO     [facility]      Patient Comments:   I have pre-surgery testing on this day   ----------------------------------   This appointment rescheduled from:   3/21/2023   11:00 AM  20 mins. Dr. Jack Wei,      [address] [place] [address]     Appointment rescheduled from [address]  [place]  Patient Appointment Schedule Request Pool 1 hour ago (11:36 AM)     Appointment For: Rolan Patel ([id number])  Visit Type: FOLLOW UP [phone]     4/25/2023   12:00 PM  20 mins. Dr. Jack Wei, DO     [facility]     Patient Comments:  I have pre-surgery testing on this day  ----------------------------------  This appointment rescheduled from:  3/21/2023   11:00 AM  20 mins.   Dr. Jack Wei, [address]

## 2023-03-20 LAB — NONINV COLON CA DNA+OCC BLD SCRN STL QL: NEGATIVE

## 2023-04-07 ENCOUNTER — TELEPHONE (OUTPATIENT)
Dept: FAMILY MEDICINE CLINIC | Age: 53
End: 2023-04-07

## 2023-04-25 ENCOUNTER — HOSPITAL ENCOUNTER (OUTPATIENT)
Dept: LAB | Age: 53
Discharge: HOME | End: 2023-04-25
Payer: COMMERCIAL

## 2023-04-25 ENCOUNTER — OFFICE VISIT (OUTPATIENT)
Dept: CARDIOLOGY CLINIC | Age: 53
End: 2023-04-25
Payer: COMMERCIAL

## 2023-04-25 VITALS
HEART RATE: 73 BPM | DIASTOLIC BLOOD PRESSURE: 80 MMHG | OXYGEN SATURATION: 99 % | HEIGHT: 64 IN | BODY MASS INDEX: 25.37 KG/M2 | WEIGHT: 148.6 LBS | SYSTOLIC BLOOD PRESSURE: 102 MMHG

## 2023-04-25 DIAGNOSIS — E78.01 FAMILIAL HYPERCHOLESTEROLEMIA: ICD-10-CM

## 2023-04-25 DIAGNOSIS — I10 PRIMARY HYPERTENSION: Primary | ICD-10-CM

## 2023-04-25 LAB
CHOLEST SERPL-MCNC: 184 MG/DL (ref 0–199)
HDLC SERPL-MCNC: 35 MG/DL (ref 40–59)
LDLC SERPL CALC-MCNC: 112 MG/DL (ref 0–129)
TRIGL SERPL-MCNC: 184 MG/DL (ref 0–150)

## 2023-04-25 PROCEDURE — 80061 LIPID PANEL: CPT

## 2023-04-25 PROCEDURE — 99214 OFFICE O/P EST MOD 30 MIN: CPT | Performed by: INTERNAL MEDICINE

## 2023-04-25 PROCEDURE — 36415 COLL VENOUS BLD VENIPUNCTURE: CPT

## 2023-04-25 PROCEDURE — 3074F SYST BP LT 130 MM HG: CPT | Performed by: INTERNAL MEDICINE

## 2023-04-25 PROCEDURE — 3079F DIAST BP 80-89 MM HG: CPT | Performed by: INTERNAL MEDICINE

## 2023-04-25 PROCEDURE — 93000 ELECTROCARDIOGRAM COMPLETE: CPT | Performed by: INTERNAL MEDICINE

## 2023-04-25 NOTE — PROGRESS NOTES
2023    Sawthi Chan MD  [address]  [place] [address]    RE: Preethi Burnham   : 1970     Dear Dr. Swathi Chan MD :    I had the pleasure of seeing your patient, Preethi Burnham in the office today on 2023. As you are well aware, Ms. Denton Samaniego is a pleasant 50 y.o.  female who returns today for follow up  of hypertension, dyslipidemia, history of tachycardia and extensive family history of CAD. She was previously following with Dr. Selene Small and briefly saw Yu Sportsman last year. She started on statin therapy at that time and is tolerating it well. Currently, she reports feeling well. She denies any chest pain or limiting exertional dyspnea with her day-to-day activities. She denies any palpitations, near-syncope, or syncope. She is compliant with medications. Current medications:   Current Outpatient Medications   Medication Sig Dispense Refill    atorvastatin (LIPITOR) 10 MG tablet Take 1 tablet by mouth daily 90 tablet 3    lisinopril-hydroCHLOROthiazide (PRINZIDE;ZESTORETIC) 20-12.5 MG per tablet TAKE 2 TABLETS DAILY 180 tablet 3    metoprolol succinate (TOPROL XL) 25 MG extended release tablet TAKE 1 TABLET NIGHTLY 90 tablet 3    Coenzyme Q10 (CO Q 10 PO) Take by mouth      Omega-3 Fatty Acids (FISH OIL) 1000 MG CAPS Take 3 capsules by mouth 3 times daily      Multiple Vitamins-Minerals (MULTI COMPLETE PO) Take  by mouth. No current facility-administered medications for this visit. Allergies: Patient has no known allergies. Review of Systems:  General: Feels generally well  Cardiovascular: See HPI. No orthopnea or PND. Respiratory: Dyspnea is present with moderate degrees of activity, nonlimiting. Gastrointestinal: No melena or hematochezia. Genitourinary: No hematuria. Hematological: No easy bruising or bleeding. Vascular: No lower extremity edema or claudication. Neurological: No TIA or CVA symptoms. No paresthesias.    Musculoskeletal: No

## 2023-05-25 ENCOUNTER — HOSPITAL ENCOUNTER (EMERGENCY)
Age: 53
Discharge: HOME | End: 2023-05-25
Attending: EMERGENCY MEDICINE
Payer: COMMERCIAL

## 2023-05-25 ENCOUNTER — APPOINTMENT (OUTPATIENT)
Dept: CT IMAGING | Age: 53
End: 2023-05-25
Payer: COMMERCIAL

## 2023-05-25 VITALS
RESPIRATION RATE: 16 BRPM | WEIGHT: 145 LBS | TEMPERATURE: 97.8 F | BODY MASS INDEX: 24.75 KG/M2 | SYSTOLIC BLOOD PRESSURE: 117 MMHG | DIASTOLIC BLOOD PRESSURE: 67 MMHG | HEART RATE: 76 BPM | HEIGHT: 64 IN | OXYGEN SATURATION: 97 %

## 2023-05-25 DIAGNOSIS — R11.0 NAUSEA: ICD-10-CM

## 2023-05-25 DIAGNOSIS — R10.9 FLANK PAIN: Primary | ICD-10-CM

## 2023-05-25 DIAGNOSIS — N20.0 KIDNEY STONE: ICD-10-CM

## 2023-05-25 LAB
ALBUMIN SERPL-MCNC: 4.5 G/DL (ref 3.5–4.6)
ALP SERPL-CCNC: 117 U/L (ref 40–130)
ALT SERPL-CCNC: 26 U/L (ref 0–33)
ANION GAP SERPL CALCULATED.3IONS-SCNC: 14 MEQ/L (ref 9–15)
AST SERPL-CCNC: 30 U/L (ref 0–35)
BACTERIA URNS QL MICRO: ABNORMAL /HPF
BASOPHILS # BLD: 0.1 K/UL (ref 0–0.1)
BASOPHILS NFR BLD: 0.5 % (ref 0.1–1.2)
BILIRUB SERPL-MCNC: 0.3 MG/DL (ref 0.2–0.7)
BILIRUB UR QL STRIP: ABNORMAL
BUN SERPL-MCNC: 13 MG/DL (ref 6–20)
CALCIUM SERPL-MCNC: 9.5 MG/DL (ref 8.5–9.9)
CHLORIDE SERPL-SCNC: 100 MEQ/L (ref 95–107)
CLARITY UR: ABNORMAL
CO2 SERPL-SCNC: 24 MEQ/L (ref 20–31)
COLOR UR: ABNORMAL
CREAT SERPL-MCNC: 0.54 MG/DL (ref 0.5–0.9)
EOSINOPHIL # BLD: 0.1 K/UL (ref 0–0.4)
EOSINOPHIL NFR BLD: 0.4 % (ref 0.7–5.8)
EPI CELLS #/AREA URNS HPF: ABNORMAL /HPF
ERYTHROCYTE [DISTWIDTH] IN BLOOD BY AUTOMATED COUNT: 12.8 % (ref 11.7–14.4)
GLOBULIN SER CALC-MCNC: 3.3 G/DL (ref 2.3–3.5)
GLUCOSE SERPL-MCNC: 201 MG/DL (ref 70–99)
GLUCOSE UR STRIP-MCNC: NEGATIVE MG/DL
HCT VFR BLD AUTO: 43.1 % (ref 37–47)
HGB BLD-MCNC: 14.5 G/DL (ref 11.2–15.7)
HGB UR QL STRIP: ABNORMAL
HYALINE CASTS #/AREA URNS LPF: ABNORMAL /LPF (ref 0–5)
IMM GRANULOCYTES # BLD: 0.1 K/UL
IMM GRANULOCYTES NFR BLD: 0.5 %
KETONES UR STRIP-MCNC: 40 MG/DL
LEUKOCYTE ESTERASE UR QL STRIP: NEGATIVE
LYMPHOCYTES # BLD: 1.8 K/UL (ref 1.2–3.7)
LYMPHOCYTES NFR BLD: 11.8 %
MCH RBC QN AUTO: 32.2 PG (ref 25.6–32.2)
MCHC RBC AUTO-ENTMCNC: 33.6 % (ref 32.2–35.5)
MCV RBC AUTO: 95.6 FL (ref 79.4–94.8)
MONOCYTES # BLD: 0.7 K/UL (ref 0.2–0.9)
MONOCYTES NFR BLD: 4.3 % (ref 4.7–12.5)
NEUTROPHILS # BLD: 12.5 K/UL (ref 1.6–6.1)
NEUTS SEG NFR BLD: 82.5 % (ref 34–71.1)
NITRITE UR QL STRIP: NEGATIVE
PH UR STRIP: 5.5 [PH] (ref 5–9)
PLATELET # BLD AUTO: 341 K/UL (ref 182–369)
POTASSIUM SERPL-SCNC: 4.1 MEQ/L (ref 3.4–4.9)
PROT SERPL-MCNC: 7.8 G/DL (ref 6.3–8)
PROT UR STRIP-MCNC: 100 MG/DL
RBC # BLD AUTO: 4.51 M/UL (ref 3.93–5.22)
RBC #/AREA URNS HPF: ABNORMAL /HPF (ref 0–2)
SODIUM SERPL-SCNC: 138 MEQ/L (ref 135–144)
SP GR UR STRIP: >=1.03 (ref 1–1.03)
URINE REFLEX TO CULTURE: ABNORMAL
UROBILINOGEN UR STRIP-ACNC: 0.2 E.U./DL
WBC # BLD AUTO: 15.1 K/UL (ref 4–10)
WBC #/AREA URNS HPF: ABNORMAL /HPF (ref 0–5)

## 2023-05-25 PROCEDURE — 6370000000 HC RX 637 (ALT 250 FOR IP): Performed by: EMERGENCY MEDICINE

## 2023-05-25 PROCEDURE — 74176 CT ABD & PELVIS W/O CONTRAST: CPT

## 2023-05-25 PROCEDURE — 96375 TX/PRO/DX INJ NEW DRUG ADDON: CPT

## 2023-05-25 PROCEDURE — 96374 THER/PROPH/DIAG INJ IV PUSH: CPT

## 2023-05-25 PROCEDURE — 2580000003 HC RX 258: Performed by: EMERGENCY MEDICINE

## 2023-05-25 PROCEDURE — 81001 URINALYSIS AUTO W/SCOPE: CPT

## 2023-05-25 PROCEDURE — 80053 COMPREHEN METABOLIC PANEL: CPT

## 2023-05-25 PROCEDURE — 85025 COMPLETE CBC W/AUTO DIFF WBC: CPT

## 2023-05-25 PROCEDURE — 99284 EMERGENCY DEPT VISIT MOD MDM: CPT

## 2023-05-25 PROCEDURE — 36415 COLL VENOUS BLD VENIPUNCTURE: CPT

## 2023-05-25 PROCEDURE — 6360000002 HC RX W HCPCS: Performed by: EMERGENCY MEDICINE

## 2023-05-25 RX ORDER — 0.9 % SODIUM CHLORIDE 0.9 %
1000 INTRAVENOUS SOLUTION INTRAVENOUS ONCE
Status: COMPLETED | OUTPATIENT
Start: 2023-05-25 | End: 2023-05-25

## 2023-05-25 RX ORDER — CIPROFLOXACIN 500 MG/1
500 TABLET, FILM COATED ORAL ONCE
Status: COMPLETED | OUTPATIENT
Start: 2023-05-25 | End: 2023-05-25

## 2023-05-25 RX ORDER — CIPROFLOXACIN 500 MG/1
500 TABLET, FILM COATED ORAL 2 TIMES DAILY
Qty: 20 TABLET | Refills: 0 | Status: SHIPPED | OUTPATIENT
Start: 2023-05-25 | End: 2023-06-04

## 2023-05-25 RX ORDER — TAMSULOSIN HYDROCHLORIDE 0.4 MG/1
0.4 CAPSULE ORAL ONCE
Status: COMPLETED | OUTPATIENT
Start: 2023-05-25 | End: 2023-05-25

## 2023-05-25 RX ORDER — ONDANSETRON 4 MG/1
4 TABLET, FILM COATED ORAL EVERY 8 HOURS PRN
Qty: 20 TABLET | Refills: 0 | Status: SHIPPED | OUTPATIENT
Start: 2023-05-25

## 2023-05-25 RX ORDER — HYDROCODONE BITARTRATE AND ACETAMINOPHEN 5; 325 MG/1; MG/1
1 TABLET ORAL EVERY 6 HOURS PRN
Qty: 12 TABLET | Refills: 0 | Status: SHIPPED | OUTPATIENT
Start: 2023-05-25 | End: 2023-05-29

## 2023-05-25 RX ORDER — MORPHINE SULFATE 4 MG/ML
4 INJECTION, SOLUTION INTRAMUSCULAR; INTRAVENOUS ONCE
Status: COMPLETED | OUTPATIENT
Start: 2023-05-25 | End: 2023-05-25

## 2023-05-25 RX ORDER — TAMSULOSIN HYDROCHLORIDE 0.4 MG/1
0.4 CAPSULE ORAL DAILY
Qty: 10 CAPSULE | Refills: 0 | Status: SHIPPED | OUTPATIENT
Start: 2023-05-25

## 2023-05-25 RX ORDER — ONDANSETRON 2 MG/ML
4 INJECTION INTRAMUSCULAR; INTRAVENOUS ONCE
Status: COMPLETED | OUTPATIENT
Start: 2023-05-25 | End: 2023-05-25

## 2023-05-25 RX ADMIN — TAMSULOSIN HYDROCHLORIDE 0.4 MG: 0.4 CAPSULE ORAL at 11:26

## 2023-05-25 RX ADMIN — CIPROFLOXACIN 500 MG: 500 TABLET, FILM COATED ORAL at 11:26

## 2023-05-25 RX ADMIN — MORPHINE SULFATE 4 MG: 4 INJECTION, SOLUTION INTRAMUSCULAR; INTRAVENOUS at 10:22

## 2023-05-25 RX ADMIN — ONDANSETRON 4 MG: 2 INJECTION INTRAMUSCULAR; INTRAVENOUS at 10:22

## 2023-05-25 RX ADMIN — SODIUM CHLORIDE 1000 ML: 9 INJECTION, SOLUTION INTRAVENOUS at 10:21

## 2023-05-25 ASSESSMENT — PAIN DESCRIPTION - LOCATION: LOCATION: FLANK

## 2023-05-25 ASSESSMENT — ENCOUNTER SYMPTOMS
DIARRHEA: 0
SORE THROAT: 0
EYE PAIN: 0
TROUBLE SWALLOWING: 0
CHOKING: 0
VOICE CHANGE: 0
STRIDOR: 0
EYE DISCHARGE: 0
CHEST TIGHTNESS: 0
WHEEZING: 0
CONSTIPATION: 0
BACK PAIN: 1
BLOOD IN STOOL: 0
ABDOMINAL PAIN: 0
VOMITING: 0
EYE REDNESS: 0
COUGH: 0
FACIAL SWELLING: 0
SINUS PRESSURE: 0
SHORTNESS OF BREATH: 0

## 2023-05-25 ASSESSMENT — PAIN SCALES - GENERAL: PAINLEVEL_OUTOF10: 8

## 2023-05-25 ASSESSMENT — PAIN DESCRIPTION - DESCRIPTORS: DESCRIPTORS: SHARP

## 2023-05-25 ASSESSMENT — PAIN DESCRIPTION - ORIENTATION: ORIENTATION: RIGHT

## 2023-05-25 NOTE — DISCHARGE INSTRUCTIONS
Drink plenty of fluids next  48 hours time you are given pain medication nausea medication, increase fiber diet to avoid constipation due to the pain medication you need to see the kidney doctor next week for further follow-up your stone is 4 mm proximally hopefully it will move and passed in the bladder

## 2023-05-25 NOTE — ED PROVIDER NOTES
Marital status:      Spouse name: None    Number of children: None    Years of education: None    Highest education level: None   Tobacco Use    Smoking status: Never    Smokeless tobacco: Never   Substance and Sexual Activity    Alcohol use: Yes    Drug use: No    Sexual activity: Yes     Partners: Male     Social Determinants of Health     Financial Resource Strain: Low Risk     Difficulty of Paying Living Expenses: Not hard at all   Food Insecurity: No Food Insecurity    Worried About [address] in the Last Year: Never true    [email or web address] in the Last Year: Never true   Transportation Needs: Unknown    Lack of Transportation (Non-Medical): No   Housing Stability: Unknown    Unstable Housing in the Last Year: No       SCREENINGS    [place] Coma Scale  Eye Opening: Spontaneous  Best Verbal Response: Oriented  Best Motor Response: Obeys commands  [place] Coma Scale Score: 15 @FLOW(03372952)@      PHYSICAL EXAM    (up to 7 for level 4, 8 or more for level 5)     ED Triage Vitals [05/25/23 0937]   BP Temp Temp Source Pulse Respirations SpO2 Height Weight - Scale   134/83 97.8 °F (36.6 °C) Temporal 80 18 97 % 5' 4\" (1.626 m) 145 lb (65.8 kg)       Physical Exam  Vitals and nursing note reviewed. Constitutional:       General: She is in acute distress. Appearance: She is well-developed. She is not ill-appearing or diaphoretic. Comments: Alert cooperative patient slightly uncomfortable to the flank pain on the right side   HENT:      Head: Normocephalic and atraumatic. Right Ear: Tympanic membrane, ear canal and external ear normal.      Left Ear: Tympanic membrane, ear canal and external ear normal.      Nose: Nose normal.      Mouth/Throat:      Mouth: Mucous membranes are moist.   Eyes:      General:         Right eye: No discharge. Left eye: No discharge. Pupils: Pupils are equal, round, and reactive to light. Neck:      Vascular: No carotid bruit.    Cardiovascular:

## 2023-05-30 ENCOUNTER — OFFICE VISIT (OUTPATIENT)
Dept: FAMILY MEDICINE CLINIC | Age: 53
End: 2023-05-30
Payer: COMMERCIAL

## 2023-05-30 VITALS
OXYGEN SATURATION: 96 % | BODY MASS INDEX: 25.16 KG/M2 | HEIGHT: 64 IN | WEIGHT: 147.4 LBS | TEMPERATURE: 97.9 F | SYSTOLIC BLOOD PRESSURE: 118 MMHG | DIASTOLIC BLOOD PRESSURE: 82 MMHG | HEART RATE: 68 BPM

## 2023-05-30 DIAGNOSIS — N20.0 KIDNEY STONE: Primary | ICD-10-CM

## 2023-05-30 PROCEDURE — 99213 OFFICE O/P EST LOW 20 MIN: CPT | Performed by: FAMILY MEDICINE

## 2023-05-30 PROCEDURE — 3074F SYST BP LT 130 MM HG: CPT | Performed by: FAMILY MEDICINE

## 2023-05-30 PROCEDURE — 3079F DIAST BP 80-89 MM HG: CPT | Performed by: FAMILY MEDICINE

## 2023-05-30 NOTE — PROGRESS NOTES
Subjective:      Patient ID: Sergio Long is a 50 y.o. female. Chief Complaint   Patient presents with    Nephrolithiasis     ER follow up, Norma Woodward on 23       HPI   Sergoi Long is a 50 y.o. female    F/u from ER for kidney stones     No more pain, finishing abx    Drinking water    Hypertension: Patient here for follow-up of elevated blood pressure. She is not formally exercising but is active and is adherent to low salt diet. Blood pressure is well controlled at home. Cardiac symptoms none. Patient denies  chest pressure/discomfort, claudication, dyspnea, exertional chest pressure/discomfort, fatigue, irregular heart beat, lower extremity edema, near-syncope, orthopnea, palpitations, paroxysmal nocturnal dyspnea, syncope and tachypnea. Cardiovascular risk factors: dyslipidemia, family history of premature cardiovascular disease and hypertension. Use of agents associated with hypertension: none. History of target organ damage: none.       Past Medical History:   Diagnosis Date    Hypertension     Mitral valve prolapse     Pre-eclampsia     Routine gynecological examination     SYLVESTER [place]     Past Surgical History:   Procedure Laterality Date     SECTION       Family History   Problem Relation Age of Onset    Heart Disease Brother 46        MI    Other Maternal Grandfather 50        MI     Social History     Socioeconomic History    Marital status:      Spouse name: None    Number of children: None    Years of education: None    Highest education level: None   Tobacco Use    Smoking status: Never    Smokeless tobacco: Never   Substance and Sexual Activity    Alcohol use: Yes    Drug use: No    Sexual activity: Yes     Partners: Male     Social Determinants of Health     Financial Resource Strain: Low Risk     Difficulty of Paying Living Expenses: Not hard at all   Food Insecurity: No Food Insecurity    Worried About [address] in the Last

## 2023-06-01 ENCOUNTER — OFFICE VISIT (OUTPATIENT)
Dept: UROLOGY | Age: 53
End: 2023-06-01
Payer: COMMERCIAL

## 2023-06-01 VITALS
SYSTOLIC BLOOD PRESSURE: 102 MMHG | BODY MASS INDEX: 25.1 KG/M2 | HEIGHT: 64 IN | HEART RATE: 62 BPM | DIASTOLIC BLOOD PRESSURE: 76 MMHG | WEIGHT: 147 LBS

## 2023-06-01 DIAGNOSIS — N20.0 KIDNEY STONE: Primary | ICD-10-CM

## 2023-06-01 PROCEDURE — 99203 OFFICE O/P NEW LOW 30 MIN: CPT | Performed by: PHYSICIAN ASSISTANT

## 2023-06-01 PROCEDURE — 3078F DIAST BP <80 MM HG: CPT | Performed by: PHYSICIAN ASSISTANT

## 2023-06-01 PROCEDURE — 3074F SYST BP LT 130 MM HG: CPT | Performed by: PHYSICIAN ASSISTANT

## 2023-06-01 ASSESSMENT — ENCOUNTER SYMPTOMS
ABDOMINAL PAIN: 0
CHEST TIGHTNESS: 0
COUGH: 0
TROUBLE SWALLOWING: 0
BACK PAIN: 0
SINUS PRESSURE: 0
SHORTNESS OF BREATH: 0
VOMITING: 0
DIARRHEA: 0

## 2023-06-01 NOTE — PROGRESS NOTES
Subjective:      Patient ID: Carmen Mcclendon is a 50 y.o. female    HPI  50year old female who presents after being diagnosed with mild right hydronephrosis secondary to a 4 x 2 mm right stone at the [facility]. For which she was seen in the ED. The patient denies any pain and states that she has been pain free for the past 5 days.      Past Medical History:   Diagnosis Date    Hypertension     Mitral valve prolapse     Pre-eclampsia     Routine gynecological examination     SYLVESTER [place]     Past Surgical History:   Procedure Laterality Date     SECTION       Social History     Socioeconomic History    Marital status:      Spouse name: None    Number of children: None    Years of education: None    Highest education level: None   Tobacco Use    Smoking status: Never    Smokeless tobacco: Never   Substance and Sexual Activity    Alcohol use: Yes    Drug use: No    Sexual activity: Yes     Partners: Male     Social Determinants of Health     Financial Resource Strain: Low Risk     Difficulty of Paying Living Expenses: Not hard at all   Food Insecurity: No Food Insecurity    Worried About Running Out of Food in the Last Year: Never true    Ran Out of Food in the Last Year: Never true   Transportation Needs: Unknown    Lack of Transportation (Non-Medical): No   Housing Stability: Unknown    Unstable Housing in the Last Year: No     Family History   Problem Relation Age of Onset    Heart Disease Brother 46        MI    Other Maternal Grandfather 50        MI     Current Outpatient Medications   Medication Sig Dispense Refill    ciprofloxacin (CIPRO) 500 MG tablet Take 1 tablet by mouth 2 times daily for 10 days 20 tablet 0    tamsulosin (FLOMAX) 0.4 MG capsule Take 1 capsule by mouth daily 10 capsule 0    atorvastatin (LIPITOR) 10 MG tablet Take 1 tablet by mouth daily 90 tablet 3    lisinopril-hydroCHLOROthiazide (PRINZIDE;ZESTORETIC) 20-12.5 MG per tablet TAKE 2 TABLETS DAILY 180 tablet 3    metoprolol

## 2023-06-20 ENCOUNTER — OFFICE VISIT (OUTPATIENT)
Dept: CARDIOLOGY CLINIC | Age: 53
End: 2023-06-20
Payer: COMMERCIAL

## 2023-06-20 VITALS
WEIGHT: 150 LBS | BODY MASS INDEX: 25.75 KG/M2 | SYSTOLIC BLOOD PRESSURE: 110 MMHG | OXYGEN SATURATION: 98 % | DIASTOLIC BLOOD PRESSURE: 80 MMHG | HEART RATE: 68 BPM

## 2023-06-20 DIAGNOSIS — E78.2 MIXED HYPERLIPIDEMIA: Primary | ICD-10-CM

## 2023-06-20 PROCEDURE — 99214 OFFICE O/P EST MOD 30 MIN: CPT | Performed by: INTERNAL MEDICINE

## 2023-06-20 PROCEDURE — 3074F SYST BP LT 130 MM HG: CPT | Performed by: INTERNAL MEDICINE

## 2023-06-20 PROCEDURE — 3079F DIAST BP 80-89 MM HG: CPT | Performed by: INTERNAL MEDICINE

## 2023-06-20 RX ORDER — ROSUVASTATIN CALCIUM 10 MG/1
5 TABLET, COATED ORAL DAILY
Qty: 30 TABLET | Refills: 3 | Status: SHIPPED | OUTPATIENT
Start: 2023-06-20

## 2023-06-20 RX ORDER — CHLORAL HYDRATE 500 MG
CAPSULE ORAL
COMMUNITY

## 2023-06-20 NOTE — PROGRESS NOTES
2023    Gonzalez Hilario MD  [address] [place] [address]    RE: Michel Sinha   : 1970     Dear Dr. Gonzalez Hilario MD :    As you are well aware, Ms. Oneal Ferrer is a pleasant 50 y.o.  female who returns today for follow up  of hypertension, dyslipidemia, history of tachycardia and extensive family history of CAD. She was previously following with Dr. Jesus Lui and briefly saw Tony Carreon last year. She started on statin therapy at that time and is tolerating it well. Currently, she reports feeling well. She denies any chest pain or limiting exertional dyspnea with her day-to-day activities. She denies any palpitations, near-syncope, or syncope. She is compliant with medications. 23: Patient here for follow-up of recent lipid panel results. She continues to deny any chest pain or limiting exertional dyspnea with her physical activities. She recently started working with a . She reports compliance with medications. Her repeat lipid panel reveals elevations in all categories despite compliance with Lipitor 10 mg.    Current medications:   Current Outpatient Medications   Medication Sig Dispense Refill    Omega-3 Fatty Acids (FISH OIL) 1000 MG capsule Take by mouth      rosuvastatin (CRESTOR) 10 MG tablet Take 0.5 tablets by mouth daily 30 tablet 3    lisinopril-hydroCHLOROthiazide (PRINZIDE;ZESTORETIC) 20-12.5 MG per tablet TAKE 2 TABLETS DAILY 180 tablet 3    metoprolol succinate (TOPROL XL) 25 MG extended release tablet TAKE 1 TABLET NIGHTLY 90 tablet 3    Multiple Vitamins-Minerals (MULTI COMPLETE PO) Take  by mouth. No current facility-administered medications for this visit. Allergies: Patient has no known allergies. Review of Systems:  General: Feels generally well  Cardiovascular: See HPI. No orthopnea or PND. Respiratory: Dyspnea is present with moderate degrees of activity, nonlimiting. Gastrointestinal: No melena or hematochezia.

## 2023-06-20 NOTE — PATIENT INSTRUCTIONS
Stop atorvastatin  Start rosuvastatin 10 mg nightly  Fasting blood work in 8 weeks (12 hour fast)  Follow up in 3 months

## 2023-06-26 RX ORDER — ROSUVASTATIN CALCIUM 10 MG/1
5 TABLET, COATED ORAL DAILY
Qty: 30 TABLET | Refills: 3 | Status: SHIPPED | OUTPATIENT
Start: 2023-06-26

## 2023-06-30 ENCOUNTER — HOSPITAL ENCOUNTER (OUTPATIENT)
Dept: GENERAL RADIOLOGY | Age: 53
Discharge: HOME | End: 2023-06-30
Payer: COMMERCIAL

## 2023-06-30 ENCOUNTER — OFFICE VISIT (OUTPATIENT)
Dept: UROLOGY | Age: 53
End: 2023-06-30
Payer: COMMERCIAL

## 2023-06-30 VITALS
SYSTOLIC BLOOD PRESSURE: 120 MMHG | WEIGHT: 147 LBS | DIASTOLIC BLOOD PRESSURE: 84 MMHG | BODY MASS INDEX: 25.1 KG/M2 | HEART RATE: 75 BPM | HEIGHT: 64 IN

## 2023-06-30 DIAGNOSIS — N20.0 KIDNEY STONE: ICD-10-CM

## 2023-06-30 DIAGNOSIS — N20.0 KIDNEY STONE: Primary | ICD-10-CM

## 2023-06-30 PROCEDURE — 74018 RADEX ABDOMEN 1 VIEW: CPT

## 2023-06-30 PROCEDURE — 99213 OFFICE O/P EST LOW 20 MIN: CPT | Performed by: PHYSICIAN ASSISTANT

## 2023-06-30 PROCEDURE — 3079F DIAST BP 80-89 MM HG: CPT | Performed by: PHYSICIAN ASSISTANT

## 2023-06-30 PROCEDURE — 3074F SYST BP LT 130 MM HG: CPT | Performed by: PHYSICIAN ASSISTANT

## 2023-06-30 ASSESSMENT — ENCOUNTER SYMPTOMS: APNEA: 0

## 2023-08-14 ENCOUNTER — HOSPITAL ENCOUNTER (OUTPATIENT)
Dept: LAB | Age: 53
Discharge: HOME | End: 2023-08-14
Payer: COMMERCIAL

## 2023-08-14 DIAGNOSIS — E78.2 MIXED HYPERLIPIDEMIA: ICD-10-CM

## 2023-08-14 LAB
ALBUMIN SERPL-MCNC: 4.4 G/DL (ref 3.5–4.6)
ALP SERPL-CCNC: 99 U/L (ref 40–130)
ALT SERPL-CCNC: 19 U/L (ref 0–33)
AST SERPL-CCNC: 21 U/L (ref 0–35)
BILIRUB DIRECT SERPL-MCNC: <0.2 MG/DL (ref 0–0.4)
BILIRUB INDIRECT SERPL-MCNC: NORMAL MG/DL (ref 0–0.6)
BILIRUB SERPL-MCNC: 0.3 MG/DL (ref 0.2–0.7)
CHOLEST SERPL-MCNC: 151 MG/DL (ref 0–199)
HDLC SERPL-MCNC: 39 MG/DL (ref 40–59)
LDLC SERPL CALC-MCNC: 77 MG/DL (ref 0–129)
PROT SERPL-MCNC: 7.2 G/DL (ref 6.3–8)
TRIGL SERPL-MCNC: 176 MG/DL (ref 0–150)

## 2023-08-14 PROCEDURE — 36415 COLL VENOUS BLD VENIPUNCTURE: CPT

## 2023-08-14 PROCEDURE — 80061 LIPID PANEL: CPT

## 2023-08-14 PROCEDURE — 80076 HEPATIC FUNCTION PANEL: CPT

## 2023-08-15 ENCOUNTER — OFFICE VISIT (OUTPATIENT)
Dept: CARDIOLOGY CLINIC | Age: 53
End: 2023-08-15
Payer: COMMERCIAL

## 2023-08-15 VITALS
WEIGHT: 152 LBS | HEART RATE: 72 BPM | OXYGEN SATURATION: 97 % | SYSTOLIC BLOOD PRESSURE: 128 MMHG | BODY MASS INDEX: 25.95 KG/M2 | DIASTOLIC BLOOD PRESSURE: 78 MMHG | HEIGHT: 64 IN

## 2023-08-15 DIAGNOSIS — E78.2 MODERATE MIXED HYPERLIPIDEMIA NOT REQUIRING STATIN THERAPY: Primary | ICD-10-CM

## 2023-08-15 PROCEDURE — 99214 OFFICE O/P EST MOD 30 MIN: CPT | Performed by: INTERNAL MEDICINE

## 2023-08-15 PROCEDURE — 3078F DIAST BP <80 MM HG: CPT | Performed by: INTERNAL MEDICINE

## 2023-08-15 PROCEDURE — 3074F SYST BP LT 130 MM HG: CPT | Performed by: INTERNAL MEDICINE

## 2023-08-15 RX ORDER — ROSUVASTATIN CALCIUM 10 MG/1
10 TABLET, COATED ORAL DAILY
Qty: 90 TABLET | Refills: 3 | Status: SHIPPED | OUTPATIENT
Start: 2023-08-15

## 2023-08-15 RX ORDER — CLOTRIMAZOLE AND BETAMETHASONE DIPROPIONATE 10; .64 MG/G; MG/G
CREAM TOPICAL 2 TIMES DAILY
COMMUNITY
Start: 2023-08-07

## 2023-08-15 NOTE — PROGRESS NOTES
8/15/2023    Ace Lind MD  [address] [place] [address]    RE: Chica Domingo   : 1970     Dear Dr. Ace Lind MD :    As you are well aware, Ms. Marija Blackwell is a pleasant 50 y.o.  female who returns today for follow up  of hypertension, dyslipidemia, history of tachycardia and extensive family history of CAD. She was previously following with Dr. Octavio Meza and briefly saw Ginger Box last year. She started on statin therapy at that time and is tolerating it well. Currently, she reports feeling well. She denies any chest pain or limiting exertional dyspnea with her day-to-day activities. She denies any palpitations, near-syncope, or syncope. She is compliant with medications. 23: Patient here for follow-up of recent lipid panel results. She continues to deny any chest pain or limiting exertional dyspnea with her physical activities. She recently started working with a . She reports compliance with medications. Her repeat lipid panel reveals elevations in all categories despite compliance with Lipitor 10 mg.    9/15/23: Patient here for follow-up of hyperlipidemia. She started low-dose Crestor and has been tolerating it well. No myalgias. No chest pain or limiting exertional dyspnea. Follow-up lipid panel shows significant improvement. Current medications:   Current Outpatient Medications   Medication Sig Dispense Refill    rosuvastatin (CRESTOR) 10 MG tablet Take 0.5 tablets by mouth daily 30 tablet 3    Omega-3 Fatty Acids (FISH OIL) 1000 MG capsule Take by mouth      lisinopril-hydroCHLOROthiazide (PRINZIDE;ZESTORETIC) 20-12.5 MG per tablet TAKE 2 TABLETS DAILY 180 tablet 3    metoprolol succinate (TOPROL XL) 25 MG extended release tablet TAKE 1 TABLET NIGHTLY 90 tablet 3    Multiple Vitamins-Minerals (MULTI COMPLETE PO) Take  by mouth.       clotrimazole-betamethasone (LOTRISONE) 1-0.05 % cream Apply topically 2 times daily APPLY TO AFFECTED AREA

## 2023-08-21 ENCOUNTER — TELEPHONE (OUTPATIENT)
Dept: CARDIOLOGY CLINIC | Age: 53
End: 2023-08-21

## 2023-08-21 NOTE — TELEPHONE ENCOUNTER
Pt wants provider to know that this weekend she ended up in ED with what she thought was a cardiac issue but [facility] said everything looked fine. She was having all the symptoms of  a heart attack without chest pain.     Pt # [phone]

## 2023-09-01 ENCOUNTER — TELEPHONE (OUTPATIENT)
Dept: FAMILY MEDICINE CLINIC | Age: 53
End: 2023-09-01

## 2023-09-01 NOTE — TELEPHONE ENCOUNTER
Pt calling stating they have been in the ED with some heart related \"episodes\" and has an appt scheduled with cardiologist and wanted to know if Dr. Gloria Randall needs to see her for a follow up in the meantime please advise

## 2023-09-20 ENCOUNTER — OFFICE VISIT (OUTPATIENT)
Dept: CARDIOLOGY CLINIC | Age: 53
End: 2023-09-20
Payer: COMMERCIAL

## 2023-09-20 VITALS
SYSTOLIC BLOOD PRESSURE: 98 MMHG | WEIGHT: 152 LBS | BODY MASS INDEX: 25.95 KG/M2 | DIASTOLIC BLOOD PRESSURE: 72 MMHG | HEIGHT: 64 IN | HEART RATE: 74 BPM | OXYGEN SATURATION: 98 %

## 2023-09-20 DIAGNOSIS — R06.09 DOE (DYSPNEA ON EXERTION): ICD-10-CM

## 2023-09-20 DIAGNOSIS — R00.2 PALPITATIONS: ICD-10-CM

## 2023-09-20 DIAGNOSIS — I10 PRIMARY HYPERTENSION: Primary | ICD-10-CM

## 2023-09-20 PROCEDURE — 99214 OFFICE O/P EST MOD 30 MIN: CPT | Performed by: INTERNAL MEDICINE

## 2023-09-20 PROCEDURE — 3078F DIAST BP <80 MM HG: CPT | Performed by: INTERNAL MEDICINE

## 2023-09-20 PROCEDURE — 3074F SYST BP LT 130 MM HG: CPT | Performed by: INTERNAL MEDICINE

## 2023-09-20 PROCEDURE — 93000 ELECTROCARDIOGRAM COMPLETE: CPT | Performed by: INTERNAL MEDICINE

## 2023-09-20 NOTE — PROGRESS NOTES
Neurologically, cranial nerves are grossly intact. No focal sensory and/or motor deficits. ECG (9/20/2023): Sinus rhythm. Heart rate 63 bpm.  Nonspecific T wave changes. 2D Echocardiogram (5/10/2022): EF 50%. No evidence of mitral regurgitation. No evidence of significant valve disease. Fasting lipid panel (4/25/2023): Total cholesterol 184, triglyceride 184, HDL 35, . When compared to prior panel 4/22/2023, her total cholesterol was 166, triglyceride 155, HDL 38, and LDL 97. Fasting lipid panel (8/14/2023): Total cholesterol 151, triglyceride 176, HDL 39, and LDL 77. IMPRESSIONS:   Functional class II (moderate activity) dyspnea on exertion, possible anginal equivalent. Symptomatic palpitations, rule out paroxysmal tachyarrhythmia. History of unspecified tachycardia in the past.  Hypertension, controlled. Hyperlipidemia, improving. Strong family history of premature CAD      RECOMMENDATIONS: Ms. Marija Blackwell is recommended an exercise stress echocardiogram to evaluate for ischemia and updated EF. I will also arrange a 2-week event monitor to better evaluate for any pathologic arrhythmias. If work-up proves negative, cardiac status would be considered stable. If abnormal, then further work-up and/or treatment is warranted. She is advised to continue her current cardiac medications. The patient is agreeable to the plan. Follow up cardiac evaluation in 4 to 6 weeks, sooner if needed. Thank you very much for allowing me to participate in Ms. Bruce's cardiac care. Should you have any questions, please feel free to contact me.      Sincerely,    Chuck Matthews DO, , [facility], [address]  [facility] [place]

## 2023-10-03 ENCOUNTER — HOSPITAL ENCOUNTER (OUTPATIENT)
Age: 53
Discharge: HOME | End: 2023-10-05
Attending: INTERNAL MEDICINE
Payer: COMMERCIAL

## 2023-10-03 DIAGNOSIS — R00.2 PALPITATIONS: ICD-10-CM

## 2023-10-03 DIAGNOSIS — R06.09 DOE (DYSPNEA ON EXERTION): ICD-10-CM

## 2023-10-03 PROCEDURE — 93350 STRESS TTE ONLY: CPT | Performed by: INTERNAL MEDICINE

## 2023-10-03 PROCEDURE — 93017 CV STRESS TEST TRACING ONLY: CPT

## 2023-10-03 PROCEDURE — 93018 CV STRESS TEST I&R ONLY: CPT | Performed by: INTERNAL MEDICINE

## 2023-10-03 PROCEDURE — 93016 CV STRESS TEST SUPVJ ONLY: CPT | Performed by: INTERNAL MEDICINE

## 2023-10-05 LAB
STRESS BASELINE DIAS BP: 83 MMHG
STRESS BASELINE HR: 69 BPM
STRESS BASELINE ST DEPRESSION: 0 MM
STRESS BASELINE SYS BP: 131 MMHG
STRESS ESTIMATED WORKLOAD: 13.4 METS
STRESS EXERCISE DUR MIN: 10 MIN
STRESS EXERCISE DUR SEC: 1 SEC
STRESS PEAK DIAS BP: 79 MMHG
STRESS PEAK SYS BP: 175 MMHG
STRESS PERCENT HR ACHIEVED: 101 %
STRESS POST PEAK HR: 171 BPM
STRESS RATE PRESSURE PRODUCT: NORMAL BPM*MMHG
STRESS TARGET HR: 170 BPM

## 2023-10-25 ENCOUNTER — TELEPHONE (OUTPATIENT)
Dept: CARDIOLOGY CLINIC | Age: 53
End: 2023-10-25

## 2023-10-25 NOTE — TELEPHONE ENCOUNTER
Patient calling states she is wondering if appointment is needed to review testing. Stress test done need monitor results.  Please call patient

## 2023-11-20 DIAGNOSIS — R00.2 PALPITATIONS: ICD-10-CM

## 2023-11-20 DIAGNOSIS — R06.09 DOE (DYSPNEA ON EXERTION): ICD-10-CM

## 2024-02-06 ENCOUNTER — TELEPHONE (OUTPATIENT)
Dept: CARDIOLOGY CLINIC | Age: 54
End: 2024-02-06

## 2024-02-06 NOTE — TELEPHONE ENCOUNTER
Appointment For: Namita RDZ Janis ([id number])   Visit Type: OFFICE VISIT (1004)      3/19/2024    2:40 PM  20 mins.  Dr. Christine Disla DO     [facility]      Patient Comments:      ----------------------------------   This appointment rescheduled from:   2/27/2024   11:00 AM  20 mins.  Dr. Christine Disla,      [facility]     Appointment rescheduled from [employer]  (Newest Message First)  View All Conversations on this Encounter  Namitasohan Bruce  P [facility] Front Desk1 hour ago (10:57 AM)       Appointment For: Namita RDZ Janis ([id number])  Visit Type: OFFICE VISIT (1004)     3/19/2024    2:40 PM  20 mins.  Dr. Christine Disla DO     [facility]     Patient Comments:     ----------------------------------  This appointment rescheduled from:  2/27/2024   11:00 AM  20 mins.  Dr. Christine Disla DO     [facility]

## 2024-02-29 ENCOUNTER — OFFICE VISIT (OUTPATIENT)
Dept: FAMILY MEDICINE CLINIC | Age: 54
End: 2024-02-29
Payer: COMMERCIAL

## 2024-02-29 VITALS
SYSTOLIC BLOOD PRESSURE: 126 MMHG | OXYGEN SATURATION: 97 % | BODY MASS INDEX: 26.8 KG/M2 | TEMPERATURE: 97.9 F | WEIGHT: 157 LBS | DIASTOLIC BLOOD PRESSURE: 84 MMHG | HEART RATE: 75 BPM | HEIGHT: 64 IN

## 2024-02-29 DIAGNOSIS — I10 ESSENTIAL HYPERTENSION: ICD-10-CM

## 2024-02-29 DIAGNOSIS — Z00.00 PREVENTATIVE HEALTH CARE: Primary | ICD-10-CM

## 2024-02-29 DIAGNOSIS — E55.9 HYPOVITAMINOSIS D: ICD-10-CM

## 2024-02-29 PROCEDURE — 99396 PREV VISIT EST AGE 40-64: CPT | Performed by: FAMILY MEDICINE

## 2024-02-29 PROCEDURE — 3074F SYST BP LT 130 MM HG: CPT | Performed by: FAMILY MEDICINE

## 2024-02-29 PROCEDURE — 3079F DIAST BP 80-89 MM HG: CPT | Performed by: FAMILY MEDICINE

## 2024-02-29 RX ORDER — LISINOPRIL AND HYDROCHLOROTHIAZIDE 20; 12.5 MG/1; MG/1
TABLET ORAL
Qty: 180 TABLET | Refills: 3 | Status: SHIPPED | OUTPATIENT
Start: 2024-02-29

## 2024-02-29 RX ORDER — METOPROLOL SUCCINATE 25 MG/1
TABLET, EXTENDED RELEASE ORAL
Qty: 90 TABLET | Refills: 3 | Status: SHIPPED | OUTPATIENT
Start: 2024-02-29

## 2024-02-29 SDOH — ECONOMIC STABILITY: FOOD INSECURITY: WITHIN THE PAST 12 MONTHS, THE FOOD YOU BOUGHT JUST DIDN'T LAST AND YOU DIDN'T HAVE MONEY TO GET MORE.: NEVER TRUE

## 2024-02-29 SDOH — ECONOMIC STABILITY: INCOME INSECURITY: HOW HARD IS IT FOR YOU TO PAY FOR THE VERY BASICS LIKE FOOD, HOUSING, MEDICAL CARE, AND HEATING?: NOT HARD AT ALL

## 2024-02-29 SDOH — ECONOMIC STABILITY: FOOD INSECURITY: WITHIN THE PAST 12 MONTHS, YOU WORRIED THAT YOUR FOOD WOULD RUN OUT BEFORE YOU GOT MONEY TO BUY MORE.: NEVER TRUE

## 2024-02-29 ASSESSMENT — PATIENT HEALTH QUESTIONNAIRE - PHQ9
2. FEELING DOWN, DEPRESSED OR HOPELESS: 0
SUM OF ALL RESPONSES TO PHQ QUESTIONS 1-9: 0
1. LITTLE INTEREST OR PLEASURE IN DOING THINGS: 0
SUM OF ALL RESPONSES TO PHQ QUESTIONS 1-9: 0
SUM OF ALL RESPONSES TO PHQ9 QUESTIONS 1 & 2: 0
SUM OF ALL RESPONSES TO PHQ QUESTIONS 1-9: 0
SUM OF ALL RESPONSES TO PHQ QUESTIONS 1-9: 0

## 2024-02-29 NOTE — PROGRESS NOTES
Subjective:      Patient ID: Namita Bruce is a 53 y.o. female.  Chief Complaint   Patient presents with    Annual Exam       HPI   Namita Bruce is a 53 y.o. female    Refills/checkup    Nothing new to report     Strong cardiac family history    Has annual checkup with cardiology       Hypertension: Patient here for follow-up of elevated blood pressure. She is not formally exercising but is active and is adherent to low salt diet.  Blood pressure is well controlled at home. Cardiac symptoms none. Patient denies  chest pressure/discomfort, claudication, dyspnea, exertional chest pressure/discomfort, fatigue, irregular heart beat, lower extremity edema, near-syncope, orthopnea, palpitations, paroxysmal nocturnal dyspnea, syncope and tachypnea.  Cardiovascular risk factors: dyslipidemia, family history of premature cardiovascular disease and hypertension. Use of agents associated with hypertension: none. History of target organ damage: none.      Past Medical History:   Diagnosis Date    Hypertension     Mitral valve prolapse     Pre-eclampsia     Routine gynecological examination     SYLVESTER [place]     Past Surgical History:   Procedure Laterality Date     SECTION       Family History   Problem Relation Age of Onset    Heart Disease Brother 52        MI    Other Maternal Grandfather 48        MI     Social History     Socioeconomic History    Marital status:      Spouse name: None    Number of children: None    Years of education: None    Highest education level: None   Tobacco Use    Smoking status: Never    Smokeless tobacco: Never   Substance and Sexual Activity    Alcohol use: Yes     Alcohol/week: 1.0 standard drink of alcohol     Types: 1 Glasses of wine per week    Drug use: No    Sexual activity: Yes     Partners: Male     Social Determinants of Health     Financial Resource Strain: Low Risk  (2024)    Overall Financial Resource Strain (CARDIA)     Difficulty of Paying Living Expenses:

## 2024-03-18 ENCOUNTER — HOSPITAL ENCOUNTER (OUTPATIENT)
Dept: LAB | Age: 54
Discharge: HOME | End: 2024-03-18
Payer: COMMERCIAL

## 2024-03-18 DIAGNOSIS — E78.2 MODERATE MIXED HYPERLIPIDEMIA NOT REQUIRING STATIN THERAPY: ICD-10-CM

## 2024-03-18 DIAGNOSIS — Z00.00 PREVENTATIVE HEALTH CARE: ICD-10-CM

## 2024-03-18 DIAGNOSIS — E55.9 HYPOVITAMINOSIS D: ICD-10-CM

## 2024-03-18 LAB
CHOLEST SERPL-MCNC: 129 MG/DL (ref 0–199)
HBA1C MFR BLD: 7.3 % (ref 4.8–5.9)
HDLC SERPL-MCNC: 32 MG/DL (ref 40–59)
LDLC SERPL CALC-MCNC: 72 MG/DL (ref 0–129)
TRIGL SERPL-MCNC: 125 MG/DL (ref 0–150)

## 2024-03-18 PROCEDURE — 36415 COLL VENOUS BLD VENIPUNCTURE: CPT

## 2024-03-18 PROCEDURE — 82306 VITAMIN D 25 HYDROXY: CPT

## 2024-03-18 PROCEDURE — 80061 LIPID PANEL: CPT

## 2024-03-18 PROCEDURE — 83036 HEMOGLOBIN GLYCOSYLATED A1C: CPT

## 2024-03-19 ENCOUNTER — OFFICE VISIT (OUTPATIENT)
Dept: CARDIOLOGY CLINIC | Age: 54
End: 2024-03-19
Payer: COMMERCIAL

## 2024-03-19 VITALS
BODY MASS INDEX: 25.95 KG/M2 | HEART RATE: 88 BPM | RESPIRATION RATE: 14 BRPM | HEIGHT: 64 IN | SYSTOLIC BLOOD PRESSURE: 122 MMHG | OXYGEN SATURATION: 96 % | DIASTOLIC BLOOD PRESSURE: 74 MMHG | WEIGHT: 152 LBS

## 2024-03-19 DIAGNOSIS — I10 PRIMARY HYPERTENSION: Primary | ICD-10-CM

## 2024-03-19 LAB — VITAMIN D 25-HYDROXY: 26.7 NG/ML (ref 30–100)

## 2024-03-19 PROCEDURE — 99214 OFFICE O/P EST MOD 30 MIN: CPT | Performed by: INTERNAL MEDICINE

## 2024-03-19 PROCEDURE — 93000 ELECTROCARDIOGRAM COMPLETE: CPT | Performed by: INTERNAL MEDICINE

## 2024-03-19 PROCEDURE — 3074F SYST BP LT 130 MM HG: CPT | Performed by: INTERNAL MEDICINE

## 2024-03-19 PROCEDURE — 3078F DIAST BP <80 MM HG: CPT | Performed by: INTERNAL MEDICINE

## 2024-03-25 ENCOUNTER — OFFICE VISIT (OUTPATIENT)
Dept: FAMILY MEDICINE CLINIC | Age: 54
End: 2024-03-25
Payer: COMMERCIAL

## 2024-03-25 VITALS
OXYGEN SATURATION: 98 % | WEIGHT: 150.4 LBS | BODY MASS INDEX: 25.68 KG/M2 | HEIGHT: 64 IN | HEART RATE: 67 BPM | TEMPERATURE: 97 F | SYSTOLIC BLOOD PRESSURE: 126 MMHG | DIASTOLIC BLOOD PRESSURE: 82 MMHG

## 2024-03-25 DIAGNOSIS — I10 ESSENTIAL HYPERTENSION: ICD-10-CM

## 2024-03-25 DIAGNOSIS — E11.9 TYPE 2 DIABETES MELLITUS WITHOUT COMPLICATION, WITHOUT LONG-TERM CURRENT USE OF INSULIN (HCC): Primary | ICD-10-CM

## 2024-03-25 PROCEDURE — 3079F DIAST BP 80-89 MM HG: CPT | Performed by: FAMILY MEDICINE

## 2024-03-25 PROCEDURE — 99213 OFFICE O/P EST LOW 20 MIN: CPT | Performed by: FAMILY MEDICINE

## 2024-03-25 PROCEDURE — 3074F SYST BP LT 130 MM HG: CPT | Performed by: FAMILY MEDICINE

## 2024-03-25 PROCEDURE — 3051F HG A1C>EQUAL 7.0%<8.0%: CPT | Performed by: FAMILY MEDICINE

## 2024-03-25 RX ORDER — BLOOD-GLUCOSE METER
1 KIT MISCELLANEOUS DAILY PRN
Qty: 1 KIT | Refills: 0 | Status: SHIPPED | OUTPATIENT
Start: 2024-03-25

## 2024-03-25 RX ORDER — GLUCOSAMINE HCL/CHONDROITIN SU 500-400 MG
1 CAPSULE ORAL DAILY
Qty: 100 STRIP | Refills: 3 | Status: SHIPPED | OUTPATIENT
Start: 2024-03-25

## 2024-03-25 NOTE — PROGRESS NOTES
Subjective:      Patient ID: Namita Bruce is a 53 y.o. female.  Chief Complaint   Patient presents with    Results     A1C results high, new onset diabetes        HPI   Namita Bruce is a 53 y.o. female    New onset DM2   Rides bike  Vegetarian diet  This is surprising     Hypertension: Patient here for follow-up of elevated blood pressure. She is not formally exercising but is active and is adherent to low salt diet.  Blood pressure is well controlled at home. Cardiac symptoms none. Patient denies  chest pressure/discomfort, claudication, dyspnea, exertional chest pressure/discomfort, fatigue, irregular heart beat, lower extremity edema, near-syncope, orthopnea, palpitations, paroxysmal nocturnal dyspnea, syncope and tachypnea.  Cardiovascular risk factors: dyslipidemia, family history of premature cardiovascular disease and hypertension. Use of agents associated with hypertension: none. History of target organ damage: none.      Past Medical History:   Diagnosis Date    Hypertension     Mitral valve prolapse     Pre-eclampsia     Routine gynecological examination     SYLVESTER Adams     Past Surgical History:   Procedure Laterality Date     SECTION       Family History   Problem Relation Age of Onset    Heart Disease Brother 52        MI    Other Maternal Grandfather 48        MI     Social History     Socioeconomic History    Marital status:      Spouse name: None    Number of children: None    Years of education: None    Highest education level: None   Tobacco Use    Smoking status: Never    Smokeless tobacco: Never   Substance and Sexual Activity    Alcohol use: Yes     Alcohol/week: 1.0 standard drink of alcohol     Types: 1 Glasses of wine per week    Drug use: No    Sexual activity: Yes     Partners: Male     Social Determinants of Health     Financial Resource Strain: Low Risk  (2024)    Overall Financial Resource Strain (CARDIA)     Difficulty of Paying Living Expenses: Not hard at

## 2024-03-26 RX ORDER — BLOOD-GLUCOSE METER
1 EACH MISCELLANEOUS DAILY
Qty: 1 KIT | Refills: 0 | Status: SHIPPED | OUTPATIENT
Start: 2024-03-26

## 2024-03-26 RX ORDER — BLOOD SUGAR DIAGNOSTIC
1 STRIP MISCELLANEOUS DAILY
Qty: 100 EACH | Refills: 3 | Status: SHIPPED | OUTPATIENT
Start: 2024-03-26

## 2024-04-24 ENCOUNTER — TELEPHONE (OUTPATIENT)
Dept: FAMILY MEDICINE CLINIC | Age: 54
End: 2024-04-24

## 2024-04-24 NOTE — TELEPHONE ENCOUNTER
Pt called and wanted to know if the doctor wants to increase the dose of the ozempic, which is new to her, she has been on it a month    Blood sugars are still running high, 160s - 170s    Please advise pt    [phone]

## 2024-06-12 RX ORDER — ROSUVASTATIN CALCIUM 10 MG/1
10 TABLET, COATED ORAL DAILY
Qty: 90 TABLET | Refills: 3 | Status: SHIPPED | OUTPATIENT
Start: 2024-06-12

## 2024-06-12 NOTE — TELEPHONE ENCOUNTER
Comments:     Last Office Visit (last PCP visit):   3/19/2024    Next Visit Date:  Future Appointments   Date Time Provider Department Center   7/25/2024 11:15 AM Carlitos Biswas MD [facility]   3/25/2025  1:00 PM [facility]         Rx requested:  Requested Prescriptions     Pending Prescriptions Disp Refills    rosuvastatin (CRESTOR) 10 MG tablet [Pharmacy Med Name: ROSUVASTATIN TAB 10MG] 90 tablet 3     Sig: TAKE 1 TABLET DAILY

## 2024-07-12 DIAGNOSIS — E11.9 TYPE 2 DIABETES MELLITUS WITHOUT COMPLICATION, WITHOUT LONG-TERM CURRENT USE OF INSULIN (HCC): ICD-10-CM

## 2024-07-12 NOTE — TELEPHONE ENCOUNTER
Comments: Please review, thanks    Last Office Visit (last PCP visit):   3/25/2024    Next Visit Date:  Future Appointments   Date Time Provider Department Center   7/30/2024 11:00 AM Carlitos Biswas MD [facility]   3/25/2025  1:00 PM Christine Disla,  [facility]       **If hasn't been seen in over a year OR hasn't followed up according to last diabetes/ADHD visit, make appointment for patient before sending refill to provider.    Rx requested:  Requested Prescriptions     Pending Prescriptions Disp Refills    OZEMPIC, 0.25 OR 0.5 MG/DOSE, 2 MG/3ML SOPN [Pharmacy Med Name: OZEMPIC 0.25-0.5 MG/DOSE PEN]  3     Sig: INJECT 0.25MG INTO THE SKIN ONE TIME PER WEEK

## 2024-07-15 ENCOUNTER — TELEPHONE (OUTPATIENT)
Dept: FAMILY MEDICINE CLINIC | Age: 54
End: 2024-07-15

## 2024-07-15 DIAGNOSIS — E11.9 TYPE 2 DIABETES MELLITUS WITHOUT COMPLICATION, WITHOUT LONG-TERM CURRENT USE OF INSULIN (HCC): Primary | ICD-10-CM

## 2024-07-15 RX ORDER — SEMAGLUTIDE 1.34 MG/ML
0.5 INJECTION, SOLUTION SUBCUTANEOUS WEEKLY
Qty: 1.5 ML | Refills: 5 | Status: SHIPPED | OUTPATIENT
Start: 2024-07-15

## 2024-07-15 RX ORDER — SEMAGLUTIDE 0.68 MG/ML
INJECTION, SOLUTION SUBCUTANEOUS
Qty: 3 ML | Refills: 3 | Status: SHIPPED | OUTPATIENT
Start: 2024-07-15

## 2024-07-15 NOTE — TELEPHONE ENCOUNTER
Orders Placed This Encounter   Medications    Semaglutide,0.25 or 0.5MG/DOS, (OZEMPIC, 0.25 OR 0.5 MG/DOSE,) 2 MG/1.5ML SOPN     Sig: Inject 0.5 mg into the skin once a week     Dispense:  1.5 mL     Refill:  5       The above med(s) were e-scripted to the patient's pharmacy.   Please advise patient  Carlitos Biswas MD

## 2024-08-07 ENCOUNTER — OFFICE VISIT (OUTPATIENT)
Dept: FAMILY MEDICINE CLINIC | Age: 54
End: 2024-08-07
Payer: COMMERCIAL

## 2024-08-07 VITALS
HEART RATE: 74 BPM | TEMPERATURE: 97.9 F | SYSTOLIC BLOOD PRESSURE: 114 MMHG | DIASTOLIC BLOOD PRESSURE: 80 MMHG | WEIGHT: 137.4 LBS | OXYGEN SATURATION: 98 % | BODY MASS INDEX: 23.46 KG/M2 | HEIGHT: 64 IN

## 2024-08-07 DIAGNOSIS — E11.9 TYPE 2 DIABETES MELLITUS WITHOUT COMPLICATION, WITHOUT LONG-TERM CURRENT USE OF INSULIN (HCC): Primary | ICD-10-CM

## 2024-08-07 DIAGNOSIS — I10 ESSENTIAL HYPERTENSION: ICD-10-CM

## 2024-08-07 DIAGNOSIS — E11.9 TYPE 2 DIABETES MELLITUS WITHOUT COMPLICATION, WITHOUT LONG-TERM CURRENT USE OF INSULIN (HCC): ICD-10-CM

## 2024-08-07 DIAGNOSIS — E78.5 DYSLIPIDEMIA: ICD-10-CM

## 2024-08-07 PROCEDURE — 3051F HG A1C>EQUAL 7.0%<8.0%: CPT | Performed by: FAMILY MEDICINE

## 2024-08-07 PROCEDURE — 3079F DIAST BP 80-89 MM HG: CPT | Performed by: FAMILY MEDICINE

## 2024-08-07 PROCEDURE — 99213 OFFICE O/P EST LOW 20 MIN: CPT | Performed by: FAMILY MEDICINE

## 2024-08-07 PROCEDURE — 3074F SYST BP LT 130 MM HG: CPT | Performed by: FAMILY MEDICINE

## 2024-08-07 RX ORDER — LISINOPRIL AND HYDROCHLOROTHIAZIDE 20; 12.5 MG/1; MG/1
TABLET ORAL
Qty: 180 TABLET | Refills: 3 | Status: SHIPPED | OUTPATIENT
Start: 2024-08-07

## 2024-08-07 RX ORDER — METOPROLOL SUCCINATE 25 MG/1
TABLET, EXTENDED RELEASE ORAL
Qty: 90 TABLET | Refills: 3 | Status: SHIPPED | OUTPATIENT
Start: 2024-08-07

## 2024-08-07 RX ORDER — ROSUVASTATIN CALCIUM 10 MG/1
10 TABLET, COATED ORAL DAILY
Qty: 90 TABLET | Refills: 3 | Status: SHIPPED | OUTPATIENT
Start: 2024-08-07

## 2024-08-07 NOTE — PROGRESS NOTES
Subjective:      Patient ID: Namita Bruce is a 53 y.o. female.  Chief Complaint   Patient presents with    Diabetes     Check up       HPI   Namita Bruce is a 53 y.o. female    F/u DM2  Ozempic  Wt Readings from Last 3 Encounters:   24 62.3 kg (137 lb 6.4 oz)   24 68.2 kg (150 lb 6.4 oz)   24 68.9 kg (152 lb)       No side effects and has lost weight     Hypertension: Patient here for follow-up of elevated blood pressure. She is not formally exercising but is active and is adherent to low salt diet.  Blood pressure is well controlled at home. Cardiac symptoms none. Patient denies  chest pressure/discomfort, claudication, dyspnea, exertional chest pressure/discomfort, fatigue, irregular heart beat, lower extremity edema, near-syncope, orthopnea, palpitations, paroxysmal nocturnal dyspnea, syncope and tachypnea.  Cardiovascular risk factors: dyslipidemia, family history of premature cardiovascular disease and hypertension. Use of agents associated with hypertension: none. History of target organ damage: none.      Past Medical History:   Diagnosis Date    Hypertension     Mitral valve prolapse     Pre-eclampsia     Routine gynecological examination     SYLVESTER Adams     Past Surgical History:   Procedure Laterality Date     SECTION       Family History   Problem Relation Age of Onset    Heart Disease Brother 52        MI    Other Maternal Grandfather 48        MI     Social History     Socioeconomic History    Marital status:      Spouse name: None    Number of children: None    Years of education: None    Highest education level: None   Tobacco Use    Smoking status: Never    Smokeless tobacco: Never   Substance and Sexual Activity    Alcohol use: Yes     Alcohol/week: 1.0 standard drink of alcohol     Types: 1 Glasses of wine per week    Drug use: No    Sexual activity: Yes     Partners: Male     Social Determinants of Health     Financial Resource Strain: Low Risk  
none

## 2024-08-08 LAB
ESTIMATED AVERAGE GLUCOSE: 126 MG/DL
HBA1C MFR BLD: 6 % (ref 4–6)

## 2024-09-29 DIAGNOSIS — U07.1 COVID-19: Primary | ICD-10-CM

## 2024-09-29 RX ORDER — NIRMATRELVIR AND RITONAVIR 150-100 MG
KIT ORAL
Qty: 20 TABLET | Refills: 0 | Status: SHIPPED | OUTPATIENT
Start: 2024-09-29 | End: 2024-10-04

## 2025-02-21 ENCOUNTER — TELEPHONE (OUTPATIENT)
Dept: CARDIOLOGY CLINIC | Age: 55
End: 2025-02-21

## 2025-04-01 ENCOUNTER — OFFICE VISIT (OUTPATIENT)
Dept: CARDIOLOGY CLINIC | Age: 55
End: 2025-04-01
Payer: COMMERCIAL

## 2025-04-01 VITALS
OXYGEN SATURATION: 96 % | BODY MASS INDEX: 23.76 KG/M2 | DIASTOLIC BLOOD PRESSURE: 68 MMHG | HEART RATE: 68 BPM | WEIGHT: 138.4 LBS | SYSTOLIC BLOOD PRESSURE: 98 MMHG

## 2025-04-01 DIAGNOSIS — I34.1 MITRAL VALVE PROLAPSE: Primary | ICD-10-CM

## 2025-04-01 DIAGNOSIS — E78.2 MIXED HYPERLIPIDEMIA: ICD-10-CM

## 2025-04-01 DIAGNOSIS — I10 ESSENTIAL HYPERTENSION: ICD-10-CM

## 2025-04-01 DIAGNOSIS — I10 PRIMARY HYPERTENSION: ICD-10-CM

## 2025-04-01 PROCEDURE — 93000 ELECTROCARDIOGRAM COMPLETE: CPT | Performed by: INTERNAL MEDICINE

## 2025-04-01 PROCEDURE — 3078F DIAST BP <80 MM HG: CPT | Performed by: INTERNAL MEDICINE

## 2025-04-01 PROCEDURE — 3074F SYST BP LT 130 MM HG: CPT | Performed by: INTERNAL MEDICINE

## 2025-04-01 PROCEDURE — 99214 OFFICE O/P EST MOD 30 MIN: CPT | Performed by: INTERNAL MEDICINE

## 2025-04-01 RX ORDER — LISINOPRIL AND HYDROCHLOROTHIAZIDE 12.5; 2 MG/1; MG/1
TABLET ORAL
Qty: 90 TABLET | Refills: 3 | Status: SHIPPED | OUTPATIENT
Start: 2025-04-01

## 2025-04-01 NOTE — PATIENT INSTRUCTIONS
Decrease lisinopril-hydrochlorothiazide to one tablet a day.   Monitor your BP at home.   Fasting cholesterol check.  Follow up in 1 year, sooner if needed.

## 2025-04-01 NOTE — PROGRESS NOTES
2025    Carlitos Biswas MD  [address]  [facility] [address]    RE: Namita Bruce   : 1970     Dear Carlitos Loya MD :    As you are well aware, Ms. Bruce is a pleasant 54 y.o.  female who returns today for follow up  of hypertension, dyslipidemia, history of tachycardia and extensive family history of CAD.  She was previously following with Dr. Gerard and briefly saw Nataliia last year.  She started on statin therapy at that time and is tolerating it well.  Currently, she reports feeling well.  She denies any chest pain or limiting exertional dyspnea with her day-to-day activities.  She denies any palpitations, near-syncope, or syncope.  She is compliant with medications.    23: Patient here for follow-up of recent lipid panel results.  She continues to deny any chest pain or limiting exertional dyspnea with her physical activities.  She recently started working with a .  She reports compliance with medications.  Her repeat lipid panel reveals elevations in all categories despite compliance with Lipitor 10 mg.    8/15/23: Patient here for follow-up of hyperlipidemia.  She started low-dose Crestor and has been tolerating it well.  No myalgias.  No chest pain or limiting exertional dyspnea.  Follow-up lipid panel shows significant improvement.    2023: Patient here for post ER follow-up.  She had 3 episodes of sudden onset vomiting, cold sweats, back pain, palpitations, and shortness of breath.  The episodes lasted about 45 minutes before gradually resolving.  She went to the ER after the first episode but subsequently discharged home.  She denies any associated chest pain.  She is not sure if the episodes were related to \"A-fib attacks\".  She does have a history of unspecified tachycardia in the past.  She has given up caffeine and has not had any recurrence however her caffeine intake was limited to 1 cup/day for the last 20 years and has not recently

## 2025-04-07 DIAGNOSIS — E11.9 TYPE 2 DIABETES MELLITUS WITHOUT COMPLICATION, WITHOUT LONG-TERM CURRENT USE OF INSULIN: ICD-10-CM

## 2025-04-07 RX ORDER — SEMAGLUTIDE 1.34 MG/ML
0.5 INJECTION, SOLUTION SUBCUTANEOUS WEEKLY
Qty: 1.5 ML | Refills: 5 | Status: SHIPPED | OUTPATIENT
Start: 2025-04-07 | End: 2025-04-08 | Stop reason: SDUPTHER

## 2025-04-08 ENCOUNTER — OFFICE VISIT (OUTPATIENT)
Dept: FAMILY MEDICINE CLINIC | Age: 55
End: 2025-04-08
Payer: COMMERCIAL

## 2025-04-08 VITALS
HEART RATE: 76 BPM | WEIGHT: 134 LBS | HEIGHT: 64 IN | SYSTOLIC BLOOD PRESSURE: 128 MMHG | BODY MASS INDEX: 22.88 KG/M2 | TEMPERATURE: 97.6 F | OXYGEN SATURATION: 97 % | DIASTOLIC BLOOD PRESSURE: 70 MMHG

## 2025-04-08 DIAGNOSIS — I10 ESSENTIAL HYPERTENSION: Primary | ICD-10-CM

## 2025-04-08 DIAGNOSIS — E11.9 TYPE 2 DIABETES MELLITUS WITHOUT COMPLICATION, WITHOUT LONG-TERM CURRENT USE OF INSULIN: ICD-10-CM

## 2025-04-08 DIAGNOSIS — E78.5 DYSLIPIDEMIA: ICD-10-CM

## 2025-04-08 PROCEDURE — 3074F SYST BP LT 130 MM HG: CPT | Performed by: FAMILY MEDICINE

## 2025-04-08 PROCEDURE — 3078F DIAST BP <80 MM HG: CPT | Performed by: FAMILY MEDICINE

## 2025-04-08 PROCEDURE — 99214 OFFICE O/P EST MOD 30 MIN: CPT | Performed by: FAMILY MEDICINE

## 2025-04-08 RX ORDER — SEMAGLUTIDE 1.34 MG/ML
0.5 INJECTION, SOLUTION SUBCUTANEOUS WEEKLY
Qty: 1.5 ML | Refills: 5 | Status: SHIPPED | OUTPATIENT
Start: 2025-04-08

## 2025-04-08 SDOH — ECONOMIC STABILITY: FOOD INSECURITY: WITHIN THE PAST 12 MONTHS, YOU WORRIED THAT YOUR FOOD WOULD RUN OUT BEFORE YOU GOT MONEY TO BUY MORE.: NEVER TRUE

## 2025-04-08 SDOH — ECONOMIC STABILITY: FOOD INSECURITY: WITHIN THE PAST 12 MONTHS, THE FOOD YOU BOUGHT JUST DIDN'T LAST AND YOU DIDN'T HAVE MONEY TO GET MORE.: NEVER TRUE

## 2025-04-08 ASSESSMENT — PATIENT HEALTH QUESTIONNAIRE - PHQ9
2. FEELING DOWN, DEPRESSED OR HOPELESS: NOT AT ALL
SUM OF ALL RESPONSES TO PHQ QUESTIONS 1-9: 0
1. LITTLE INTEREST OR PLEASURE IN DOING THINGS: NOT AT ALL
SUM OF ALL RESPONSES TO PHQ QUESTIONS 1-9: 0

## 2025-04-08 NOTE — PROGRESS NOTES
shortness of breath, or wheezing  Cardiovascular ROS: no chest pain or dyspnea on exertion  Gastrointestinal ROS: no abdominal pain, change in bowel habits, or black or bloody stools  Genito-Urinary ROS: no dysuria, trouble voiding, or hematuria  Musculoskeletal ROS: negative for - gait disturbance, joint pain or joint stiffness  Neurological ROS: negative for - behavioral changes, memory loss, numbness/tingling, tremors or weakness    In general patient otherwise reports feeling well.       Objective:   Physical Exam:  /70 (BP Site: Right Upper Arm)   Pulse 76   Temp 97.6 °F (36.4 °C)   Ht 1.626 m (5' 4\")   Wt 60.8 kg (134 lb)   SpO2 97%   BMI 23.00 kg/m²     Gen: Well, NAD, Alert, Oriented x 3   HEENT: EOMI, eyes clear, MMM  Skin: without rash or jaundice  Neck: no significant lymphadenopathy or thyromegaly  Lungs: CTA B w/out Rales/Wheezes/Rhonchi, Good respiratory effort   Heart: RRR, S1S2, w/out M/R/G, non-displaced PMI     Ext: No C/C/E Bilaterally.   Psych: euthymic        Assessment:       Diagnosis Orders   1. Essential hypertension        2. Type 2 diabetes mellitus without complication, without long-term current use of insulin  Semaglutide,0.25 or 0.5MG/DOS, (OZEMPIC, 0.25 OR 0.5 MG/DOSE,) 2 MG/1.5ML SOPN    CBC    Comprehensive Metabolic Panel    Hemoglobin A1C    Albumin/Creatinine Ratio, Urine      3. Dyslipidemia                     Plan:     Recheck a1c    Updated health maintenance.    Continue Daily ASA recommended omega 3 fatty acids    Continues to do well           Carlitos Biswas MD

## 2025-04-11 ENCOUNTER — HOSPITAL ENCOUNTER (OUTPATIENT)
Dept: LAB | Age: 55
Discharge: HOME | End: 2025-04-11
Payer: COMMERCIAL

## 2025-04-11 DIAGNOSIS — E11.9 TYPE 2 DIABETES MELLITUS WITHOUT COMPLICATION, WITHOUT LONG-TERM CURRENT USE OF INSULIN: ICD-10-CM

## 2025-04-11 DIAGNOSIS — E78.2 MIXED HYPERLIPIDEMIA: ICD-10-CM

## 2025-04-11 LAB
ALBUMIN SERPL-MCNC: 4.5 G/DL (ref 3.5–4.6)
ALP SERPL-CCNC: 83 U/L (ref 40–130)
ALT SERPL-CCNC: 19 U/L (ref 0–33)
ANION GAP SERPL CALCULATED.3IONS-SCNC: 9 MEQ/L (ref 9–15)
AST SERPL-CCNC: 24 U/L (ref 0–35)
BILIRUB SERPL-MCNC: 0.3 MG/DL (ref 0.2–0.7)
BUN SERPL-MCNC: 11 MG/DL (ref 6–20)
CALCIUM SERPL-MCNC: 9.6 MG/DL (ref 8.5–9.9)
CHLORIDE SERPL-SCNC: 102 MEQ/L (ref 95–107)
CHOLEST SERPL-MCNC: 130 MG/DL (ref 0–199)
CO2 SERPL-SCNC: 28 MEQ/L (ref 20–31)
CREAT SERPL-MCNC: 0.52 MG/DL (ref 0.5–0.9)
CREAT UR-MCNC: 65.2 MG/DL
ERYTHROCYTE [DISTWIDTH] IN BLOOD BY AUTOMATED COUNT: 13.6 % (ref 11.5–14.5)
GLOBULIN SER CALC-MCNC: 2.6 G/DL (ref 2.3–3.5)
GLUCOSE SERPL-MCNC: 82 MG/DL (ref 70–99)
HCT VFR BLD AUTO: 42.3 % (ref 37–47)
HDLC SERPL-MCNC: 40 MG/DL (ref 40–59)
HGB BLD-MCNC: 14 G/DL (ref 12–16)
LDL CHOLESTEROL: 66 MG/DL (ref 0–129)
MCH RBC QN AUTO: 31.4 PG (ref 27–31.3)
MCHC RBC AUTO-ENTMCNC: 33.1 % (ref 33–37)
MCV RBC AUTO: 94.8 FL (ref 79.4–94.8)
MICROALBUMIN UR-MCNC: <1.2 MG/DL
MICROALBUMIN/CREAT UR-RTO: NORMAL MG/G (ref 0–30)
PLATELET # BLD AUTO: 419 K/UL (ref 130–400)
POTASSIUM SERPL-SCNC: 4.6 MEQ/L (ref 3.4–4.9)
PROT SERPL-MCNC: 7.1 G/DL (ref 6.3–8)
RBC # BLD AUTO: 4.46 M/UL (ref 4.2–5.4)
SODIUM SERPL-SCNC: 139 MEQ/L (ref 135–144)
TRIGLYCERIDE, FASTING: 122 MG/DL (ref 0–150)
WBC # BLD AUTO: 9.4 K/UL (ref 4.8–10.8)

## 2025-04-11 PROCEDURE — 80061 LIPID PANEL: CPT

## 2025-04-11 PROCEDURE — 80053 COMPREHEN METABOLIC PANEL: CPT

## 2025-04-11 PROCEDURE — 82570 ASSAY OF URINE CREATININE: CPT

## 2025-04-11 PROCEDURE — 83036 HEMOGLOBIN GLYCOSYLATED A1C: CPT

## 2025-04-11 PROCEDURE — 82043 UR ALBUMIN QUANTITATIVE: CPT

## 2025-04-11 PROCEDURE — 85027 COMPLETE CBC AUTOMATED: CPT

## 2025-04-11 PROCEDURE — 36415 COLL VENOUS BLD VENIPUNCTURE: CPT

## 2025-04-12 LAB
ESTIMATED AVERAGE GLUCOSE: 111 MG/DL
HBA1C MFR BLD: 5.5 % (ref 4–6)

## 2025-04-14 ENCOUNTER — RESULTS FOLLOW-UP (OUTPATIENT)
Dept: FAMILY MEDICINE CLINIC | Age: 55
End: 2025-04-14